# Patient Record
Sex: MALE | Race: WHITE | NOT HISPANIC OR LATINO
[De-identification: names, ages, dates, MRNs, and addresses within clinical notes are randomized per-mention and may not be internally consistent; named-entity substitution may affect disease eponyms.]

---

## 2017-01-06 ENCOUNTER — RX RENEWAL (OUTPATIENT)
Age: 72
End: 2017-01-06

## 2017-01-09 ENCOUNTER — CHART COPY (OUTPATIENT)
Age: 72
End: 2017-01-09

## 2017-01-09 DIAGNOSIS — M23.92 UNSPECIFIED INTERNAL DERANGEMENT OF LEFT KNEE: ICD-10-CM

## 2017-01-30 ENCOUNTER — RX RENEWAL (OUTPATIENT)
Age: 72
End: 2017-01-30

## 2017-03-13 DIAGNOSIS — M23.92 UNSPECIFIED INTERNAL DERANGEMENT OF LEFT KNEE: ICD-10-CM

## 2017-03-29 ENCOUNTER — APPOINTMENT (OUTPATIENT)
Dept: PHARMACY | Facility: CLINIC | Age: 72
End: 2017-03-29

## 2017-04-24 ENCOUNTER — RX RENEWAL (OUTPATIENT)
Age: 72
End: 2017-04-24

## 2017-05-09 ENCOUNTER — RX RENEWAL (OUTPATIENT)
Age: 72
End: 2017-05-09

## 2017-08-01 ENCOUNTER — RX RENEWAL (OUTPATIENT)
Age: 72
End: 2017-08-01

## 2017-08-01 RX ORDER — DICLOFENAC SODIUM 10 MG/G
1 GEL TOPICAL
Qty: 45 | Refills: 1 | Status: ACTIVE | COMMUNITY
Start: 2017-01-09 | End: 1900-01-01

## 2017-08-01 RX ORDER — TRIAMCINOLONE ACETONIDE 1 MG/G
0.1 CREAM TOPICAL
Qty: 1 | Refills: 2 | Status: ACTIVE | COMMUNITY
Start: 2017-08-01 | End: 1900-01-01

## 2017-08-04 ENCOUNTER — RX RENEWAL (OUTPATIENT)
Age: 72
End: 2017-08-04

## 2017-09-26 ENCOUNTER — RX RENEWAL (OUTPATIENT)
Age: 72
End: 2017-09-26

## 2017-10-03 ENCOUNTER — RX RENEWAL (OUTPATIENT)
Age: 72
End: 2017-10-03

## 2017-12-04 ENCOUNTER — RX RENEWAL (OUTPATIENT)
Age: 72
End: 2017-12-04

## 2017-12-11 ENCOUNTER — LABORATORY RESULT (OUTPATIENT)
Age: 72
End: 2017-12-11

## 2017-12-20 ENCOUNTER — RX RENEWAL (OUTPATIENT)
Age: 72
End: 2017-12-20

## 2017-12-20 RX ORDER — METHYLPREDNISOLONE 4 MG/1
4 TABLET ORAL
Qty: 50 | Refills: 1 | Status: ACTIVE | COMMUNITY
Start: 2017-03-13 | End: 1900-01-01

## 2017-12-20 RX ORDER — AMLODIPINE BESYLATE 5 MG/1
5 TABLET ORAL
Qty: 180 | Refills: 3 | Status: ACTIVE | COMMUNITY
Start: 2017-01-06 | End: 1900-01-01

## 2018-01-29 ENCOUNTER — RX RENEWAL (OUTPATIENT)
Age: 73
End: 2018-01-29

## 2018-08-09 ENCOUNTER — APPOINTMENT (OUTPATIENT)
Dept: PHARMACY | Facility: CLINIC | Age: 73
End: 2018-08-09
Payer: SELF-PAY

## 2018-08-09 PROCEDURE — V5299A: CUSTOM | Mod: NC

## 2018-08-27 ENCOUNTER — APPOINTMENT (OUTPATIENT)
Dept: SPEECH THERAPY | Facility: CLINIC | Age: 73
End: 2018-08-27

## 2018-08-27 ENCOUNTER — OUTPATIENT (OUTPATIENT)
Dept: OUTPATIENT SERVICES | Facility: HOSPITAL | Age: 73
LOS: 1 days | Discharge: HOME | End: 2018-08-27

## 2018-08-28 DIAGNOSIS — H90.A21 SENSORINEURAL HEARING LOSS, UNILATERAL, RIGHT EAR, WITH RESTRICTED HEARING ON THE CONTRALATERAL SIDE: ICD-10-CM

## 2018-10-10 ENCOUNTER — APPOINTMENT (OUTPATIENT)
Dept: PHARMACY | Facility: CLINIC | Age: 73
End: 2018-10-10
Payer: SELF-PAY

## 2018-10-10 PROCEDURE — V5014I: CUSTOM

## 2019-02-14 ENCOUNTER — APPOINTMENT (OUTPATIENT)
Dept: OTOLARYNGOLOGY | Facility: CLINIC | Age: 74
End: 2019-02-14
Payer: MEDICARE

## 2019-02-14 VITALS
WEIGHT: 229 LBS | SYSTOLIC BLOOD PRESSURE: 132 MMHG | DIASTOLIC BLOOD PRESSURE: 86 MMHG | HEIGHT: 70 IN | HEART RATE: 63 BPM | BODY MASS INDEX: 32.78 KG/M2

## 2019-02-14 DIAGNOSIS — R04.0 EPISTAXIS: ICD-10-CM

## 2019-02-14 PROCEDURE — 99204 OFFICE O/P NEW MOD 45 MIN: CPT | Mod: 25

## 2019-02-14 PROCEDURE — 31238 NSL/SINS NDSC SRG NSL HEMRRG: CPT | Mod: RT

## 2019-02-14 RX ORDER — PANTOPRAZOLE 20 MG/1
20 TABLET, DELAYED RELEASE ORAL
Qty: 90 | Refills: 0 | Status: ACTIVE | COMMUNITY
Start: 2018-12-12

## 2019-02-14 RX ORDER — ASPIRIN 81 MG
81 TABLET, DELAYED RELEASE (ENTERIC COATED) ORAL
Refills: 0 | Status: ACTIVE | COMMUNITY

## 2019-02-14 RX ORDER — NITROFURAZONE 0.2 %
OINTMENT (GRAM) TOPICAL
Refills: 0 | Status: ACTIVE | COMMUNITY

## 2019-02-14 RX ORDER — TADALAFIL 5 MG/1
5 TABLET ORAL
Qty: 5 | Refills: 0 | Status: ACTIVE | COMMUNITY
Start: 2019-01-18

## 2019-02-14 RX ORDER — AMLODIPINE AND VALSARTAN 10; 160 MG/1; MG/1
10-160 TABLET, FILM COATED ORAL
Qty: 45 | Refills: 0 | Status: ACTIVE | COMMUNITY
Start: 2018-11-10

## 2019-02-14 RX ORDER — DOXYCYCLINE HYCLATE 100 MG/1
100 TABLET ORAL
Qty: 11 | Refills: 0 | Status: ACTIVE | COMMUNITY
Start: 2018-08-15

## 2019-02-14 RX ORDER — FLUTICASONE FUROATE 27.5 UG/1
27.5 SPRAY, METERED NASAL
Refills: 0 | Status: ACTIVE | COMMUNITY

## 2019-02-14 RX ORDER — DOXYCYCLINE HYCLATE 100 MG/1
100 CAPSULE ORAL
Qty: 20 | Refills: 0 | Status: ACTIVE | COMMUNITY
Start: 2018-10-29

## 2019-02-14 RX ORDER — METFORMIN ER 500 MG 500 MG/1
500 TABLET ORAL
Qty: 360 | Refills: 0 | Status: ACTIVE | COMMUNITY
Start: 2018-12-23

## 2019-02-14 NOTE — PHYSICAL EXAM
[Midline] : trachea located in midline position [Normal] : no rashes [FreeTextEntry1] : right sided facial asymmetrcy

## 2019-02-14 NOTE — CONSULT LETTER
[Dear  ___] : Dear  [unfilled], [Consult Letter:] : I had the pleasure of evaluating your patient, [unfilled]. [Please see my note below.] : Please see my note below. [Consult Closing:] : Thank you very much for allowing me to participate in the care of this patient.  If you have any questions, please do not hesitate to contact me. [Sincerely,] : Sincerely, [FreeTextEntry3] : Erickson Clark MD\par [facility]\par Otolaryngology and Facial Plastics\par Associated Professor, Mary\par

## 2019-02-14 NOTE — HISTORY OF PRESENT ILLNESS
[de-identified] : Patient has had about two nosebleeds over the last few days. He has had dryness in both sides of the nose. He was using flonase and then some afrin as well in the right nostril at night and this didn’t help. He states that he had minor pain in the nostril one day last week as well. He does not have any nasal congestion or runny nose. HE tookhis blood pressure when this happened both times and it was only high after yesterday nosebleeds. He had an acoustic neuroma in 1987 and knows of his facial droop on the right. He has had the nosebleeds mostly from the right nostril

## 2019-02-14 NOTE — ASSESSMENT
[FreeTextEntry1] : Patient complaining of recurrent epistaxis predominately from his right nasal cavity. Patient has a history of having had an acoustic neuroma in the past. On examination endoscopically the source of bleeding was identified and cauterized with silver nitrate. He will followup with us as needed.

## 2020-12-24 ENCOUNTER — OUTPATIENT (OUTPATIENT)
Dept: OUTPATIENT SERVICES | Facility: HOSPITAL | Age: 75
LOS: 1 days | End: 2020-12-24
Payer: MEDICARE

## 2020-12-24 DIAGNOSIS — Z20.828 CONTACT WITH AND (SUSPECTED) EXPOSURE TO OTHER VIRAL COMMUNICABLE DISEASES: ICD-10-CM

## 2020-12-24 LAB — SARS-COV-2 RNA SPEC QL NAA+PROBE: SIGNIFICANT CHANGE UP

## 2020-12-24 PROCEDURE — U0003: CPT

## 2020-12-24 PROCEDURE — C9803: CPT

## 2020-12-25 DIAGNOSIS — Z20.828 CONTACT WITH AND (SUSPECTED) EXPOSURE TO OTHER VIRAL COMMUNICABLE DISEASES: ICD-10-CM

## 2021-01-06 ENCOUNTER — APPOINTMENT (OUTPATIENT)
Dept: PHARMACY | Facility: CLINIC | Age: 76
End: 2021-01-06
Payer: SELF-PAY

## 2021-01-06 PROCEDURE — V5267D: CUSTOM

## 2021-03-22 ENCOUNTER — TRANSCRIPTION ENCOUNTER (OUTPATIENT)
Age: 76
End: 2021-03-22

## 2021-04-06 ENCOUNTER — NON-APPOINTMENT (OUTPATIENT)
Age: 76
End: 2021-04-06

## 2021-04-12 ENCOUNTER — APPOINTMENT (OUTPATIENT)
Dept: OTOLARYNGOLOGY | Facility: CLINIC | Age: 76
End: 2021-04-12
Payer: MEDICARE

## 2021-04-12 VITALS
BODY MASS INDEX: 32.21 KG/M2 | HEIGHT: 70 IN | WEIGHT: 225 LBS | DIASTOLIC BLOOD PRESSURE: 96 MMHG | TEMPERATURE: 98 F | SYSTOLIC BLOOD PRESSURE: 163 MMHG | HEART RATE: 89 BPM

## 2021-04-12 PROCEDURE — 99213 OFFICE O/P EST LOW 20 MIN: CPT

## 2021-04-12 PROCEDURE — 99072 ADDL SUPL MATRL&STAF TM PHE: CPT

## 2021-04-13 NOTE — ASSESSMENT
[FreeTextEntry1] : Patient comes for annual evaluation had some cerumen impaction bilaterally which was curetted out the rest of his examination was essentially normal no other major complaints he just recently had some foot surgery is healing well he will follow up with us as needed.

## 2021-07-28 ENCOUNTER — APPOINTMENT (OUTPATIENT)
Dept: PHARMACY | Facility: CLINIC | Age: 76
End: 2021-07-28
Payer: SELF-PAY

## 2021-07-28 PROCEDURE — V5014C: CUSTOM | Mod: LT

## 2021-09-13 ENCOUNTER — INPATIENT (INPATIENT)
Facility: HOSPITAL | Age: 76
LOS: 4 days | Discharge: HOME HEALTH | End: 2021-09-18
Attending: INTERNAL MEDICINE | Admitting: INTERNAL MEDICINE
Payer: MEDICARE

## 2021-09-13 VITALS
SYSTOLIC BLOOD PRESSURE: 145 MMHG | TEMPERATURE: 101 F | DIASTOLIC BLOOD PRESSURE: 87 MMHG | HEART RATE: 109 BPM | OXYGEN SATURATION: 97 % | RESPIRATION RATE: 18 BRPM

## 2021-09-13 LAB
APTT BLD: 32.8 SEC — SIGNIFICANT CHANGE UP (ref 27–36.3)
HCT VFR BLD CALC: 41 % — SIGNIFICANT CHANGE UP (ref 39–50)
HGB BLD-MCNC: 13.5 G/DL — SIGNIFICANT CHANGE UP (ref 13–17)
IANC: 9.96 K/UL — HIGH (ref 1.5–8.5)
INR BLD: 1.13 RATIO — SIGNIFICANT CHANGE UP (ref 0.88–1.16)
MCHC RBC-ENTMCNC: 29.9 PG — SIGNIFICANT CHANGE UP (ref 27–34)
MCHC RBC-ENTMCNC: 32.9 GM/DL — SIGNIFICANT CHANGE UP (ref 32–36)
MCV RBC AUTO: 90.7 FL — SIGNIFICANT CHANGE UP (ref 80–100)
PLATELET # BLD AUTO: 165 K/UL — SIGNIFICANT CHANGE UP (ref 150–400)
PROTHROM AB SERPL-ACNC: 12.8 SEC — SIGNIFICANT CHANGE UP (ref 10.6–13.6)
RBC # BLD: 4.52 M/UL — SIGNIFICANT CHANGE UP (ref 4.2–5.8)
RBC # FLD: 13.5 % — SIGNIFICANT CHANGE UP (ref 10.3–14.5)
WBC # BLD: 11.12 K/UL — HIGH (ref 3.8–10.5)
WBC # FLD AUTO: 11.12 K/UL — HIGH (ref 3.8–10.5)

## 2021-09-13 RX ORDER — ACETAMINOPHEN 500 MG
650 TABLET ORAL ONCE
Refills: 0 | Status: COMPLETED | OUTPATIENT
Start: 2021-09-13 | End: 2021-09-13

## 2021-09-13 RX ORDER — CEFTRIAXONE 500 MG/1
1000 INJECTION, POWDER, FOR SOLUTION INTRAMUSCULAR; INTRAVENOUS ONCE
Refills: 0 | Status: COMPLETED | OUTPATIENT
Start: 2021-09-13 | End: 2021-09-13

## 2021-09-13 RX ORDER — VANCOMYCIN HCL 1 G
1000 VIAL (EA) INTRAVENOUS ONCE
Refills: 0 | Status: COMPLETED | OUTPATIENT
Start: 2021-09-13 | End: 2021-09-13

## 2021-09-13 RX ADMIN — Medication 650 MILLIGRAM(S): at 23:43

## 2021-09-13 RX ADMIN — CEFTRIAXONE 100 MILLIGRAM(S): 500 INJECTION, POWDER, FOR SOLUTION INTRAMUSCULAR; INTRAVENOUS at 23:44

## 2021-09-13 NOTE — ED PROVIDER NOTE - ATTENDING CONTRIBUTION TO CARE
I performed a face-to-face evaluation of the patient and performed a history and physical examination. I agree with the history and physical examination.    Yfn: Several weeks of right lower extremity pain, swelling, and redness. Responded to oral antibiotics; now worsening after a long car ride. Consider DVT (recent long ride; get ultrasound. Admit for vancomycin. I performed a face-to-face evaluation of the patient and performed a history and physical examination. I agree with the history and physical examination.    Yfn: H/o prostate Bx f/b AMS. Around the same, time, had several weeks of right lower extremity pain, swelling, and redness. Responded to oral antibiotics (Cipro, Keflex, Doxy); now worsening after a long car ride (went to [employer] and to [place] [employer]). RLE: not significantly warm/red/tender. Consider DVT (recent long ride); get ultrasound. No known tick bites (and took Doxy), but consider Lyme. Check labs. Has SIRS criteria. Admit for vancomycin and CXR.

## 2021-09-13 NOTE — ED PROVIDER NOTE - PHYSICAL EXAMINATION
gen: well appearing  Mentation: AAO x 3  psych: mood appropriate  ENT: airway patent  Eyes: conjunctivae clear bilaterally  Cardio: RRR, no m/r/g  Resp: normal BS b/l  GI: s/nt/nd  : no CVA tenderness  Neuro: sensation and motor function intact  Skin: No evidence of rash, no erythema of LE noted   MSK: normal movement of all extremities  Lymph/Vasc: + b/l LE edema, nontender

## 2021-09-13 NOTE — ED PROVIDER NOTE - NS ED ROS FT
CONSTITUTIONAL: +fevers, no lightheadedness, no dizziness  Eyes: no visual changes  Ears: no ear drainage, no ear pain  Nose: no nasal congestion  Mouth/Throat: no sore throat  CV: No chest pain, no palpitations  PULM: No SOB, no cough  GI: No n/v/d, no abd pain  : no dysuria, no hematuria  SKIN: +RLE redness  NEURO: no headache, no focal weakness or numbness  LYMPH/VASC: + b/l LE swelling

## 2021-09-13 NOTE — ED ADULT TRIAGE NOTE - CHIEF COMPLAINT QUOTE
Pt c/o reoccurring RLE cellulitis, fevers since today. Also c/o chills. Last took tylenol at 4pm. PMHx brain tumor, hepatitis A, R. foot sx for bunion/hammer toe, pre-diabetes    Wife (Amelia) - [phone]

## 2021-09-13 NOTE — ED PROVIDER NOTE - PROGRESS NOTE DETAILS
MD CHO:  I received s/o on this pt from Dr. Coulter.  Admitted to hospitalist service for sepsis.  Spoke with Dr. [place] Saunders who requested pt be switched to the service of Dr. Jose M Bell.  I spoke with Dr. Bell who accepts the pt to his service.  Charge nurse, Nerissa, spoke with bed board regarding change to accepting attending physician.

## 2021-09-13 NOTE — ED PROVIDER NOTE - OBJECTIVE STATEMENT
Son = Mark [facility]: [phone] Son = Mark Jules: [phone]  74 y/o M with PMH of HTN recent prostate biopsy presenting with fevers. 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 States symptoms worse after car ride to [place]. Admits to b/l LE edema, states is around baseline. No cp, cough, sob, n/v/d, abd pain, dysuria, hematuria.

## 2021-09-13 NOTE — ED PROVIDER NOTE - CLINICAL SUMMARY MEDICAL DECISION MAKING FREE TEXT BOX
Yfn: Several weeks of right lower extremity pain, swelling, and redness. Responded to oral antibiotics; now worsening after a long car ride. Consider DVT (recent long ride; get ultrasound. Admit for vancomycin. Yfn: H/o prostate Bx f/b AMS. Around the same, time, had several weeks of right lower extremity pain, swelling, and redness. Responded to oral antibiotics (Cipro, Keflex, Doxy); now worsening after a long car ride (went to [facility] and to [employer] for [employer]). RLE: not significantly warm/red/tender. Consider DVT (recent long ride); get ultrasound. No known tick bites (and took Doxy), but consider Lyme. Check labs. Has SIRS criteria. Admit for vancomycin and CXR.

## 2021-09-14 DIAGNOSIS — E78.5 HYPERLIPIDEMIA, UNSPECIFIED: ICD-10-CM

## 2021-09-14 DIAGNOSIS — L03.90 CELLULITIS, UNSPECIFIED: ICD-10-CM

## 2021-09-14 DIAGNOSIS — E11.9 TYPE 2 DIABETES MELLITUS WITHOUT COMPLICATIONS: ICD-10-CM

## 2021-09-14 DIAGNOSIS — R50.9 FEVER, UNSPECIFIED: ICD-10-CM

## 2021-09-14 DIAGNOSIS — I10 ESSENTIAL (PRIMARY) HYPERTENSION: ICD-10-CM

## 2021-09-14 DIAGNOSIS — M79.89 OTHER SPECIFIED SOFT TISSUE DISORDERS: ICD-10-CM

## 2021-09-14 LAB
ALBUMIN SERPL ELPH-MCNC: 4.2 G/DL — SIGNIFICANT CHANGE UP (ref 3.3–5)
ALP SERPL-CCNC: 117 U/L — SIGNIFICANT CHANGE UP (ref 40–120)
ALT FLD-CCNC: 24 U/L — SIGNIFICANT CHANGE UP (ref 4–41)
ANION GAP SERPL CALC-SCNC: 15 MMOL/L — HIGH (ref 7–14)
APPEARANCE UR: CLEAR — SIGNIFICANT CHANGE UP
AST SERPL-CCNC: 22 U/L — SIGNIFICANT CHANGE UP (ref 4–40)
B PERT DNA SPEC QL NAA+PROBE: SIGNIFICANT CHANGE UP
B PERT DNA SPEC QL NAA+PROBE: SIGNIFICANT CHANGE UP
B PERT+PARAPERT DNA PNL SPEC NAA+PROBE: SIGNIFICANT CHANGE UP
B PERT+PARAPERT DNA PNL SPEC NAA+PROBE: SIGNIFICANT CHANGE UP
BASOPHILS # BLD AUTO: 0.04 K/UL — SIGNIFICANT CHANGE UP (ref 0–0.2)
BASOPHILS NFR BLD AUTO: 0.4 % — SIGNIFICANT CHANGE UP (ref 0–2)
BILIRUB SERPL-MCNC: 0.5 MG/DL — SIGNIFICANT CHANGE UP (ref 0.2–1.2)
BILIRUB UR-MCNC: NEGATIVE — SIGNIFICANT CHANGE UP
BLOOD GAS VENOUS COMPREHENSIVE RESULT: SIGNIFICANT CHANGE UP
BLOOD GAS VENOUS COMPREHENSIVE RESULT: SIGNIFICANT CHANGE UP
BORDETELLA PARAPERTUSSIS (RAPRVP): SIGNIFICANT CHANGE UP
BORDETELLA PARAPERTUSSIS (RAPRVP): SIGNIFICANT CHANGE UP
BUN SERPL-MCNC: 21 MG/DL — SIGNIFICANT CHANGE UP (ref 7–23)
C PNEUM DNA SPEC QL NAA+PROBE: SIGNIFICANT CHANGE UP
C PNEUM DNA SPEC QL NAA+PROBE: SIGNIFICANT CHANGE UP
CALCIUM SERPL-MCNC: 8.9 MG/DL — SIGNIFICANT CHANGE UP (ref 8.4–10.5)
CHLORIDE SERPL-SCNC: 99 MMOL/L — SIGNIFICANT CHANGE UP (ref 98–107)
CO2 SERPL-SCNC: 21 MMOL/L — LOW (ref 22–31)
COLOR SPEC: SIGNIFICANT CHANGE UP
CREAT SERPL-MCNC: 1.19 MG/DL — SIGNIFICANT CHANGE UP (ref 0.5–1.3)
DIFF PNL FLD: NEGATIVE — SIGNIFICANT CHANGE UP
EOSINOPHIL # BLD AUTO: 0.03 K/UL — SIGNIFICANT CHANGE UP (ref 0–0.5)
EOSINOPHIL NFR BLD AUTO: 0.3 % — SIGNIFICANT CHANGE UP (ref 0–6)
FLUAV SUBTYP SPEC NAA+PROBE: SIGNIFICANT CHANGE UP
FLUAV SUBTYP SPEC NAA+PROBE: SIGNIFICANT CHANGE UP
FLUBV RNA SPEC QL NAA+PROBE: SIGNIFICANT CHANGE UP
FLUBV RNA SPEC QL NAA+PROBE: SIGNIFICANT CHANGE UP
GLUCOSE SERPL-MCNC: 170 MG/DL — HIGH (ref 70–99)
GLUCOSE UR QL: NEGATIVE — SIGNIFICANT CHANGE UP
GP B STREP DNA BLD POS QL NAA+NON-PROBE: SIGNIFICANT CHANGE UP
GRAM STN FLD: SIGNIFICANT CHANGE UP
GRAM STN FLD: SIGNIFICANT CHANGE UP
HADV DNA SPEC QL NAA+PROBE: SIGNIFICANT CHANGE UP
HADV DNA SPEC QL NAA+PROBE: SIGNIFICANT CHANGE UP
HCOV 229E RNA SPEC QL NAA+PROBE: SIGNIFICANT CHANGE UP
HCOV 229E RNA SPEC QL NAA+PROBE: SIGNIFICANT CHANGE UP
HCOV HKU1 RNA SPEC QL NAA+PROBE: SIGNIFICANT CHANGE UP
HCOV HKU1 RNA SPEC QL NAA+PROBE: SIGNIFICANT CHANGE UP
HCOV NL63 RNA SPEC QL NAA+PROBE: SIGNIFICANT CHANGE UP
HCOV NL63 RNA SPEC QL NAA+PROBE: SIGNIFICANT CHANGE UP
HCOV OC43 RNA SPEC QL NAA+PROBE: SIGNIFICANT CHANGE UP
HCOV OC43 RNA SPEC QL NAA+PROBE: SIGNIFICANT CHANGE UP
HMPV RNA SPEC QL NAA+PROBE: SIGNIFICANT CHANGE UP
HMPV RNA SPEC QL NAA+PROBE: SIGNIFICANT CHANGE UP
HPIV1 RNA SPEC QL NAA+PROBE: SIGNIFICANT CHANGE UP
HPIV1 RNA SPEC QL NAA+PROBE: SIGNIFICANT CHANGE UP
HPIV2 RNA SPEC QL NAA+PROBE: SIGNIFICANT CHANGE UP
HPIV2 RNA SPEC QL NAA+PROBE: SIGNIFICANT CHANGE UP
HPIV3 RNA SPEC QL NAA+PROBE: SIGNIFICANT CHANGE UP
HPIV3 RNA SPEC QL NAA+PROBE: SIGNIFICANT CHANGE UP
HPIV4 RNA SPEC QL NAA+PROBE: SIGNIFICANT CHANGE UP
HPIV4 RNA SPEC QL NAA+PROBE: SIGNIFICANT CHANGE UP
IMM GRANULOCYTES NFR BLD AUTO: 0.5 % — SIGNIFICANT CHANGE UP (ref 0–1.5)
KETONES UR-MCNC: NEGATIVE — SIGNIFICANT CHANGE UP
LEUKOCYTE ESTERASE UR-ACNC: NEGATIVE — SIGNIFICANT CHANGE UP
LYME C6 AB IGG/IGM EIA REFLEX WESTERN BL: SIGNIFICANT CHANGE UP
LYMPHOCYTES # BLD AUTO: 0.35 K/UL — LOW (ref 1–3.3)
LYMPHOCYTES # BLD AUTO: 3.1 % — LOW (ref 13–44)
M PNEUMO DNA SPEC QL NAA+PROBE: SIGNIFICANT CHANGE UP
M PNEUMO DNA SPEC QL NAA+PROBE: SIGNIFICANT CHANGE UP
METHOD TYPE: SIGNIFICANT CHANGE UP
MONOCYTES # BLD AUTO: 0.68 K/UL — SIGNIFICANT CHANGE UP (ref 0–0.9)
MONOCYTES NFR BLD AUTO: 6.1 % — SIGNIFICANT CHANGE UP (ref 2–14)
NEUTROPHILS # BLD AUTO: 9.96 K/UL — HIGH (ref 1.8–7.4)
NEUTROPHILS NFR BLD AUTO: 89.6 % — HIGH (ref 43–77)
NITRITE UR-MCNC: NEGATIVE — SIGNIFICANT CHANGE UP
NRBC # BLD: 0 /100 WBCS — SIGNIFICANT CHANGE UP
NRBC # FLD: 0 K/UL — SIGNIFICANT CHANGE UP
PH UR: 6 — SIGNIFICANT CHANGE UP (ref 5–8)
POTASSIUM SERPL-MCNC: 4.1 MMOL/L — SIGNIFICANT CHANGE UP (ref 3.5–5.3)
POTASSIUM SERPL-SCNC: 4.1 MMOL/L — SIGNIFICANT CHANGE UP (ref 3.5–5.3)
PROT SERPL-MCNC: 6.7 G/DL — SIGNIFICANT CHANGE UP (ref 6–8.3)
PROT UR-MCNC: NEGATIVE — SIGNIFICANT CHANGE UP
RAPID RVP RESULT: SIGNIFICANT CHANGE UP
RAPID RVP RESULT: SIGNIFICANT CHANGE UP
RSV RNA SPEC QL NAA+PROBE: SIGNIFICANT CHANGE UP
RSV RNA SPEC QL NAA+PROBE: SIGNIFICANT CHANGE UP
RV+EV RNA SPEC QL NAA+PROBE: SIGNIFICANT CHANGE UP
RV+EV RNA SPEC QL NAA+PROBE: SIGNIFICANT CHANGE UP
SARS-COV-2 RNA SPEC QL NAA+PROBE: SIGNIFICANT CHANGE UP
SODIUM SERPL-SCNC: 135 MMOL/L — SIGNIFICANT CHANGE UP (ref 135–145)
SP GR SPEC: 1.01 — SIGNIFICANT CHANGE UP (ref 1–1.05)
SPECIMEN SOURCE: SIGNIFICANT CHANGE UP
SPECIMEN SOURCE: SIGNIFICANT CHANGE UP
UROBILINOGEN FLD QL: SIGNIFICANT CHANGE UP

## 2021-09-14 PROCEDURE — 93971 EXTREMITY STUDY: CPT | Mod: 26,LT

## 2021-09-14 PROCEDURE — 71045 X-RAY EXAM CHEST 1 VIEW: CPT | Mod: 26

## 2021-09-14 PROCEDURE — 99223 1ST HOSP IP/OBS HIGH 75: CPT

## 2021-09-14 RX ORDER — VANCOMYCIN HCL 1 G
1000 VIAL (EA) INTRAVENOUS EVERY 12 HOURS
Refills: 0 | Status: DISCONTINUED | OUTPATIENT
Start: 2021-09-14 | End: 2021-09-15

## 2021-09-14 RX ORDER — CEFAZOLIN SODIUM 1 G
VIAL (EA) INJECTION
Refills: 0 | Status: DISCONTINUED | OUTPATIENT
Start: 2021-09-14 | End: 2021-09-14

## 2021-09-14 RX ORDER — INFLUENZA VIRUS VACCINE 15; 15; 15; 15 UG/.5ML; UG/.5ML; UG/.5ML; UG/.5ML
0.5 SUSPENSION INTRAMUSCULAR ONCE
Refills: 0 | Status: DISCONTINUED | OUTPATIENT
Start: 2021-09-14 | End: 2021-09-14

## 2021-09-14 RX ORDER — PANTOPRAZOLE SODIUM 20 MG/1
40 TABLET, DELAYED RELEASE ORAL
Refills: 0 | Status: DISCONTINUED | OUTPATIENT
Start: 2021-09-14 | End: 2021-09-18

## 2021-09-14 RX ORDER — VALSARTAN 80 MG/1
160 TABLET ORAL DAILY
Refills: 0 | Status: DISCONTINUED | OUTPATIENT
Start: 2021-09-14 | End: 2021-09-18

## 2021-09-14 RX ORDER — SODIUM CHLORIDE 9 MG/ML
1000 INJECTION INTRAMUSCULAR; INTRAVENOUS; SUBCUTANEOUS ONCE
Refills: 0 | Status: COMPLETED | OUTPATIENT
Start: 2021-09-14 | End: 2021-09-14

## 2021-09-14 RX ORDER — INFLUENZA VIRUS VACCINE 15; 15; 15; 15 UG/.5ML; UG/.5ML; UG/.5ML; UG/.5ML
0.7 SUSPENSION INTRAMUSCULAR ONCE
Refills: 0 | Status: COMPLETED | OUTPATIENT
Start: 2021-09-14 | End: 2021-09-18

## 2021-09-14 RX ORDER — ACETAMINOPHEN 500 MG
650 TABLET ORAL EVERY 6 HOURS
Refills: 0 | Status: DISCONTINUED | OUTPATIENT
Start: 2021-09-14 | End: 2021-09-18

## 2021-09-14 RX ORDER — CEFAZOLIN SODIUM 1 G
2000 VIAL (EA) INJECTION EVERY 8 HOURS
Refills: 0 | Status: DISCONTINUED | OUTPATIENT
Start: 2021-09-14 | End: 2021-09-16

## 2021-09-14 RX ORDER — ENOXAPARIN SODIUM 100 MG/ML
40 INJECTION SUBCUTANEOUS DAILY
Refills: 0 | Status: DISCONTINUED | OUTPATIENT
Start: 2021-09-14 | End: 2021-09-16

## 2021-09-14 RX ORDER — SODIUM CHLORIDE 9 MG/ML
1000 INJECTION, SOLUTION INTRAVENOUS ONCE
Refills: 0 | Status: COMPLETED | OUTPATIENT
Start: 2021-09-14 | End: 2021-09-14

## 2021-09-14 RX ORDER — AMLODIPINE BESYLATE 2.5 MG/1
5 TABLET ORAL DAILY
Refills: 0 | Status: DISCONTINUED | OUTPATIENT
Start: 2021-09-14 | End: 2021-09-18

## 2021-09-14 RX ORDER — SODIUM CHLORIDE 0.65 %
1 AEROSOL, SPRAY (ML) NASAL
Refills: 0 | Status: DISCONTINUED | OUTPATIENT
Start: 2021-09-14 | End: 2021-09-18

## 2021-09-14 RX ADMIN — Medication 650 MILLIGRAM(S): at 20:03

## 2021-09-14 RX ADMIN — Medication 650 MILLIGRAM(S): at 19:33

## 2021-09-14 RX ADMIN — Medication 250 MILLIGRAM(S): at 19:33

## 2021-09-14 RX ADMIN — VALSARTAN 160 MILLIGRAM(S): 80 TABLET ORAL at 15:08

## 2021-09-14 RX ADMIN — AMLODIPINE BESYLATE 5 MILLIGRAM(S): 2.5 TABLET ORAL at 15:08

## 2021-09-14 RX ADMIN — Medication 650 MILLIGRAM(S): at 00:45

## 2021-09-14 RX ADMIN — SODIUM CHLORIDE 1000 MILLILITER(S): 9 INJECTION, SOLUTION INTRAVENOUS at 03:55

## 2021-09-14 RX ADMIN — ENOXAPARIN SODIUM 40 MILLIGRAM(S): 100 INJECTION SUBCUTANEOUS at 15:08

## 2021-09-14 RX ADMIN — SODIUM CHLORIDE 1000 MILLILITER(S): 9 INJECTION INTRAMUSCULAR; INTRAVENOUS; SUBCUTANEOUS at 00:26

## 2021-09-14 RX ADMIN — Medication 250 MILLIGRAM(S): at 00:14

## 2021-09-14 RX ADMIN — Medication 100 MILLIGRAM(S): at 21:41

## 2021-09-14 RX ADMIN — PANTOPRAZOLE SODIUM 40 MILLIGRAM(S): 20 TABLET, DELAYED RELEASE ORAL at 15:09

## 2021-09-14 RX ADMIN — Medication 1 SPRAY(S): at 15:08

## 2021-09-14 NOTE — H&P ADULT - NSHPREVIEWOFSYSTEMS_GEN_ALL_CORE
Gen: no loss of wt no loss of appetite see above HPI   ENT: no dizziness no hearing loss  Ophth: no blurring of vision no loss of vision  Resp: No cough no sputum production  CVS: No chest pain no palpitations no orthopnea  GI: no nausea, vomiting see above HPI   : no dysuria, hematuria  Endo: no polyuria no excessive sweating  Neuro: no weakness no paresthesias  Heme: No petechiae no easy bruising  Msk: No joint pain no swelling  Skin: No rash no itching

## 2021-09-14 NOTE — CONSULT NOTE ADULT - PROBLEM SELECTOR RECOMMENDATION 9
Blood and urine cultures pending    Abx based on culture results    Case discussed with ED staff , nursing staff, and with Dr. Jose M Bell

## 2021-09-14 NOTE — H&P ADULT - REASON FOR ADMISSION
fever and chills Saucerization Excision Additional Text (Leave Blank If You Do Not Want): The margin was drawn around the clinically apparent lesion.  Incisions were then made along these lines, in a tangential fashion, to the appropriate tissue plane and the lesion was extirpated.

## 2021-09-14 NOTE — ED ADULT NURSE REASSESSMENT NOTE - NS ED NURSE REASSESS COMMENT FT1
Repeat lactate sent when pt returned from [place]. Respirations even and unlabored. Will continue to monitor.

## 2021-09-14 NOTE — ED ADULT NURSE NOTE - CHIEF COMPLAINT QUOTE
Pt c/o reoccurring RLE cellulitis, fevers since today. Also c/o chills. Last took tylenol at 4pm. PMHx brain tumor, hepatitis A, R. foot sx for bunion/hammer toe, pre-diabetes    Wife (Amelia) - [phone]

## 2021-09-14 NOTE — ED ADULT NURSE REASSESSMENT NOTE - NS ED NURSE REASSESS COMMENT FT1
patient aaox3. ambulatory. came in with bilateral lower extremity edema. noticed to have more left than right edema. non pitting. patient able to ambulate independently. skin warm and dry. pulses palpable. positive for blood cultures. denies fevers, chills, fatigue, weakness, lightheadedness. appears comfortable. respirations even and unlabored on room air. left ac #20g. Will continue to monitor

## 2021-09-14 NOTE — H&P ADULT - NSICDXFAMILYHX_GEN_ALL_CORE_FT
FAMILY HISTORY:  Father  Still living? Unknown  Family hx of prostate cancer, Age at diagnosis: Age Unknown  FH: type 2 diabetes, Age at diagnosis: Age Unknown    Mother  Still living? Unknown  FH: type 2 diabetes, Age at diagnosis: Age Unknown

## 2021-09-14 NOTE — H&P ADULT - ASSESSMENT
76 y/o M with PMH of HTN, diet controlled DM, HLD recent prostate biopsy presenting with fevers of unclear etiology

## 2021-09-14 NOTE — H&P ADULT - NSHPPHYSICALEXAM_GEN_ALL_CORE
PHYSICAL EXAM: vital signs noted on [place]  in no apparent distress  HEENT: SHA EOMI  Neck: Supple, no JVD, no thyromegaly  Lungs: no wheeze, no crackles  CVS: S1 S2 no M/R/G  Abdomen: no tenderness, no organomegaly, BS present  Neuro: AO x 3 no focal weakness, no sensory abnormalities  Psych: appropriate affect  Skin: warm, dry  Ext: no cyanosis or clubbing, trace bilateral edema  Msk: no joint swelling or deformities  Back: no CVA tenderness, no kyphosis/scoliosis

## 2021-09-14 NOTE — H&P ADULT - HISTORY OF PRESENT ILLNESS
76 y/o M with PMH of HTN, recent prostate biopsy 2 months ago, HLD, pre-DM presenting with fevers. 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 He states symptoms were worse after car ride to [place]. Admits to b/l SANJAY edema, states is around baseline. No dysuria at all. + 3 episodes of loose stools yesterday last BM yesterday. Took Pepto-Bismol with resolution of diarrhea

## 2021-09-14 NOTE — PROVIDER CONTACT NOTE (CRITICAL VALUE NOTIFICATION) - SITUATION
blood cultures positive for gram+ cocci in pairs in anaerobic
blood cultures positive for gram+ cocci in pairs in anaerobic

## 2021-09-14 NOTE — H&P ADULT - NSHPADDITIONALINFOADULT_GEN_ALL_CORE
discussed with patient in detail, expresses understanding of treatment plans.  discussed with PCP DR Saunders

## 2021-09-14 NOTE — H&P ADULT - PROBLEM SELECTOR PLAN 1
unclear etiology  urinalysis is negative  CXR negative  exam unremarkable  no evidence of residual cellulitis   urine culture and blood cultures testing  s/p ceftriaxone IV and vancomycin in the Emergency Department  ID help requested  will defer further antibiotics to ID

## 2021-09-14 NOTE — ED ADULT NURSE NOTE - OBJECTIVE STATEMENT
Pt received to rm 12, A&OX4, ambulatory. Hx of cellulitis. Recently treated for cellulitis to R leg without relief. RLE appears red and swollen, warm to touch. Left LE appears swollen. States he recently went on a long car ride and symptoms worsened, prompting visit to ED. Respirations even and unlabored. 20G IV placed to RAC. Denies CP, SOB, N/V/D. Labs sent. Medicated per orders. Will continue to monitor.

## 2021-09-15 DIAGNOSIS — Z29.9 ENCOUNTER FOR PROPHYLACTIC MEASURES, UNSPECIFIED: ICD-10-CM

## 2021-09-15 DIAGNOSIS — R78.81 BACTEREMIA: ICD-10-CM

## 2021-09-15 LAB
A1C WITH ESTIMATED AVERAGE GLUCOSE RESULT: 5.7 % — HIGH (ref 4–5.6)
ANION GAP SERPL CALC-SCNC: 11 MMOL/L — SIGNIFICANT CHANGE UP (ref 7–14)
BUN SERPL-MCNC: 14 MG/DL — SIGNIFICANT CHANGE UP (ref 7–23)
CALCIUM SERPL-MCNC: 8.7 MG/DL — SIGNIFICANT CHANGE UP (ref 8.4–10.5)
CHLORIDE SERPL-SCNC: 103 MMOL/L — SIGNIFICANT CHANGE UP (ref 98–107)
CHOLEST SERPL-MCNC: 152 MG/DL — SIGNIFICANT CHANGE UP
CO2 SERPL-SCNC: 23 MMOL/L — SIGNIFICANT CHANGE UP (ref 22–31)
CREAT SERPL-MCNC: 1.04 MG/DL — SIGNIFICANT CHANGE UP (ref 0.5–1.3)
CULTURE RESULTS: NO GROWTH — SIGNIFICANT CHANGE UP
CULTURE RESULTS: SIGNIFICANT CHANGE UP
ESTIMATED AVERAGE GLUCOSE: 117 — SIGNIFICANT CHANGE UP
GLUCOSE SERPL-MCNC: 143 MG/DL — HIGH (ref 70–99)
HCT VFR BLD CALC: 38 % — LOW (ref 39–50)
HCV AB S/CO SERPL IA: 0.15 S/CO — SIGNIFICANT CHANGE UP (ref 0–0.99)
HCV AB SERPL-IMP: SIGNIFICANT CHANGE UP
HDLC SERPL-MCNC: 44 MG/DL — SIGNIFICANT CHANGE UP
HGB BLD-MCNC: 12.7 G/DL — LOW (ref 13–17)
LIPID PNL WITH DIRECT LDL SERPL: 85 MG/DL — SIGNIFICANT CHANGE UP
MCHC RBC-ENTMCNC: 30.9 PG — SIGNIFICANT CHANGE UP (ref 27–34)
MCHC RBC-ENTMCNC: 33.4 GM/DL — SIGNIFICANT CHANGE UP (ref 32–36)
MCV RBC AUTO: 92.5 FL — SIGNIFICANT CHANGE UP (ref 80–100)
NON HDL CHOLESTEROL: 108 MG/DL — SIGNIFICANT CHANGE UP
NRBC # BLD: 0 /100 WBCS — SIGNIFICANT CHANGE UP
NRBC # FLD: 0 K/UL — SIGNIFICANT CHANGE UP
PLATELET # BLD AUTO: 137 K/UL — LOW (ref 150–400)
POTASSIUM SERPL-MCNC: 3.7 MMOL/L — SIGNIFICANT CHANGE UP (ref 3.5–5.3)
POTASSIUM SERPL-SCNC: 3.7 MMOL/L — SIGNIFICANT CHANGE UP (ref 3.5–5.3)
RBC # BLD: 4.11 M/UL — LOW (ref 4.2–5.8)
RBC # FLD: 13.6 % — SIGNIFICANT CHANGE UP (ref 10.3–14.5)
SODIUM SERPL-SCNC: 137 MMOL/L — SIGNIFICANT CHANGE UP (ref 135–145)
SPECIMEN SOURCE: SIGNIFICANT CHANGE UP
SPECIMEN SOURCE: SIGNIFICANT CHANGE UP
TRIGL SERPL-MCNC: 116 MG/DL — SIGNIFICANT CHANGE UP
TSH SERPL-MCNC: 1.51 UIU/ML — SIGNIFICANT CHANGE UP (ref 0.27–4.2)
WBC # BLD: 4 K/UL — SIGNIFICANT CHANGE UP (ref 3.8–10.5)
WBC # FLD AUTO: 4 K/UL — SIGNIFICANT CHANGE UP (ref 3.8–10.5)

## 2021-09-15 PROCEDURE — 93971 EXTREMITY STUDY: CPT | Mod: 26,LT

## 2021-09-15 PROCEDURE — 99233 SBSQ HOSP IP/OBS HIGH 50: CPT

## 2021-09-15 RX ORDER — VALSARTAN 80 MG/1
1 TABLET ORAL
Qty: 0 | Refills: 0 | DISCHARGE

## 2021-09-15 RX ORDER — METFORMIN HYDROCHLORIDE 850 MG/1
1 TABLET ORAL
Qty: 0 | Refills: 0 | DISCHARGE

## 2021-09-15 RX ORDER — OXYBUTYNIN CHLORIDE 5 MG
1 TABLET ORAL
Qty: 0 | Refills: 0 | DISCHARGE

## 2021-09-15 RX ORDER — PANTOPRAZOLE SODIUM 20 MG/1
1 TABLET, DELAYED RELEASE ORAL
Qty: 0 | Refills: 0 | DISCHARGE

## 2021-09-15 RX ORDER — OXYBUTYNIN CHLORIDE 5 MG
5 TABLET ORAL
Refills: 0 | Status: DISCONTINUED | OUTPATIENT
Start: 2021-09-15 | End: 2021-09-18

## 2021-09-15 RX ADMIN — Medication 100 MILLIGRAM(S): at 06:18

## 2021-09-15 RX ADMIN — Medication 100 MILLIGRAM(S): at 21:14

## 2021-09-15 RX ADMIN — AMLODIPINE BESYLATE 5 MILLIGRAM(S): 2.5 TABLET ORAL at 06:18

## 2021-09-15 RX ADMIN — Medication 5 MILLIGRAM(S): at 18:11

## 2021-09-15 RX ADMIN — ENOXAPARIN SODIUM 40 MILLIGRAM(S): 100 INJECTION SUBCUTANEOUS at 11:53

## 2021-09-15 RX ADMIN — Medication 100 MILLIGRAM(S): at 13:37

## 2021-09-15 RX ADMIN — VALSARTAN 160 MILLIGRAM(S): 80 TABLET ORAL at 06:18

## 2021-09-15 RX ADMIN — PANTOPRAZOLE SODIUM 40 MILLIGRAM(S): 20 TABLET, DELAYED RELEASE ORAL at 06:18

## 2021-09-15 RX ADMIN — Medication 250 MILLIGRAM(S): at 07:41

## 2021-09-15 NOTE — PROGRESS NOTE ADULT - SUBJECTIVE AND OBJECTIVE BOX
Patient is a 75y old  Male who presents with a chief complaint of fever and chills      INTERVAL HPI/OVERNIGHT EVENTS: No reported overnight events. Pt denies any complaints overnight. Has had some redness and pain of L shin. BCx returned positive for GBS. Pt discussed extensive hx of working out in the woods, swimming in pools, etc that could have led to cellulitis. Pt also has had hx of 3 lifetime cellulitic infections of the leg.       REVIEW OF SYSTEMS:  CONSTITUTIONAL: No fever, weight loss, or fatigue  EYES: No eye pain, visual disturbances, or discharge  ENMT:  No difficulty hearing, tinnitus, vertigo; No sinus or throat pain  NECK: No pain or stiffness  BREASTS: No pain, masses, or nipple discharge  RESPIRATORY: No cough, wheezing, chills or hemoptysis; No shortness of breath  CARDIOVASCULAR: No chest pain, palpitations, dizziness, or leg swelling  GASTROINTESTINAL: No abdominal or epigastric pain. No nausea, vomiting, or hematemesis; No diarrhea or constipation. No melena or hematochezia.  GENITOURINARY: No dysuria, frequency, hematuria, or incontinence  NEUROLOGICAL: No headaches, memory loss, loss of strength, numbness, or tremors  SKIN: No itching, burning, rashes, or lesions   LYMPH NODES: No enlarged glands  ENDOCRINE: No heat or cold intolerance; No hair loss  MUSCULOSKELETAL: No joint pain or swelling; No muscle, back, or extremity pain  PSYCHIATRIC: No depression, anxiety, mood swings, or difficulty sleeping  HEME/LYMPH: No easy bruising, or bleeding gums  ALLERGY AND IMMUNOLOGIC: No hives or eczema    FAMILY HISTORY:  Family hx of prostate cancer (Father)    FH: type 2 diabetes (Father, Mother)      Vital Signs Last 24 Hrs  T(C): 36.5 (15 Sep 2021 06:14), Max: 37.9 (14 Sep 2021 18:56)  T(F): 97.7 (15 Sep 2021 06:14), Max: 100.2 (14 Sep 2021 18:56)  HR: 62 (15 Sep 2021 06:14) (62 - 86)  BP: 144/85 (15 Sep 2021 06:14) (114/85 - 145/98)  BP(mean): --  RR: 18 (15 Sep 2021 06:14) (16 - 18)  SpO2: 95% (15 Sep 2021 06:14) (95% - 98%)    ALLERGIES  penicillin (Rash)    PHYSICAL EXAM:  GENERAL: NAD, well-groomed, well-developed  HEAD:  Atraumatic, Normocephalic  EYES: EOMI, PERRLA, conjunctiva and sclera clear  ENMT: No tonsillar erythema, exudates, or enlargement; Moist mucous membranes, Good dentition, No lesions  NECK: Supple, No JVD, Normal thyroid  NERVOUS SYSTEM:  Alert & Oriented X3, Good concentration; Motor Strength 5/5 B/L upper and lower extremities; DTRs 2+ intact and symmetric  CHEST/LUNG: Clear to percussion bilaterally; No rales, rhonchi, wheezing, or rubs  HEART: Regular rate and rhythm; No murmurs, rubs, or gallops  ABDOMEN: Soft, Nontender, Nondistended; Bowel sounds present  EXTREMITIES:  2+ Peripheral Pulses, No clubbing, cyanosis. 1+ pitting edema b/l  LYMPH: No lymphadenopathy noted  SKIN: No rashes or lesions. Erythematous and warm, raised area of L shin over tibia    Consultant(s) Notes Reviewed:  [x ] YES  [ ] NO  Care Discussed with Consultants/Other Providers [ x] YES  [ ] NO    LABS:                        12.7   4.00  )-----------( 137      ( 15 Sep 2021 07:43 )             38.0     09-15    137  |  103  |  14  ----------------------------<  143<H>  3.7   |  23  |  1.04    Ca    8.7      15 Sep 2021 07:43    TPro  6.7  /  Alb  4.2  /  TBili  0.5  /  DBili  x   /  AST  22  /  ALT  24  /  AlkPhos  117  09-13      RADIOLOGY & ADDITIONAL TESTS:    Imaging Personally Reviewed:  [ ] YES  [ ] NO  acetaminophen   Tablet .. 650 milliGRAM(s) Oral every 6 hours PRN  amLODIPine   Tablet 5 milliGRAM(s) Oral daily  ceFAZolin   IVPB 2000 milliGRAM(s) IV Intermittent every 8 hours  enoxaparin Injectable 40 milliGRAM(s) SubCutaneous daily  influenza  Vaccine (HIGH DOSE) 0.7 milliLiter(s) IntraMuscular once  oxybutynin 5 milliGRAM(s) Oral two times a day  pantoprazole    Tablet 40 milliGRAM(s) Oral before breakfast  sodium chloride 0.65% Nasal 1 Spray(s) Both Nostrils four times a day PRN  valsartan 160 milliGRAM(s) Oral daily      HEALTH ISSUES - PROBLEM Dx:  Fever    HTN (hypertension)    HLD (hyperlipidemia)    DM (diabetes mellitus)    Cellulitis of skin

## 2021-09-15 NOTE — PROGRESS NOTE ADULT - ASSESSMENT
76 yo M with HTN, prior prostate biopsy (2 months ago), presenting with chills  Leukocytosis, fever  CXR clear  BCX GBS 2/4  LLE cellulitis  Overall,  1) GBS bacteremia  - Source in LLE cellulitis  - Cefazolin 2g q 8 (DC Vanco)  - Repeat BCXs until clear  - Check TTE  2) Leukocytosis  - Trend to normal  3) Cellulitis  - Monitor LLE for improvement    Jaun Belle MD  Pager [phone]  From 5pm-9am, and on weekends call [phone]

## 2021-09-15 NOTE — PROGRESS NOTE ADULT - ATTENDING COMMENTS
seen and examined  above noted and personally confirmed as accurate by me    PE unchanged except for new warmth and erythema left LE below the knee anteriorly  no new murmur on cardiac auscultation    blood cultures notably positive for Group B strept    ID help appreciated  continue Cefazolin IV 2 GM q 8    hemodynamically stable   echocardiogram   continue to follow ID recommendations    start home med Oxybutynin 5 BID  discussed with patient in detail, expresses understanding of treatment plans.  discussed with house staff in detail

## 2021-09-15 NOTE — PROGRESS NOTE ADULT - PROBLEM SELECTOR PLAN 2
Pt found to have warmth and redness on L lower extremity. Has hx of cellulitis in R leg. Currently clinically improving with normal vital signs, WBC downtrending, normal DVT study  - Continue Ancef  - Reassess L leg if pain worsens

## 2021-09-15 NOTE — PROGRESS NOTE ADULT - PROBLEM SELECTOR PLAN 1
Pt found to have BCx positive for GBS. Currently clinically improving with normal vital signs, WBC downtrending  - f/u repeat BCx, echocardiogram to r/o vegetations in setting of gram positive bacteremia   - Continue Ancef

## 2021-09-15 NOTE — PROGRESS NOTE ADULT - ASSESSMENT
76 y/o M with PMH of HTN, diet controlled DM, HLD recent prostate biopsy presenting with fevers of unclear etiology. Pt found to have BCx positive for GBS, s/p vancomycin and currently tx with Ancef. Tmax 100.2, WBC count downtrending, currently stable.

## 2021-09-15 NOTE — PROGRESS NOTE ADULT - SUBJECTIVE AND OBJECTIVE BOX
CC: F/U for Bacteremia    Saw/spoke to patient. No fevers, no chills. No new complaints.    Allergies  penicillin (Rash)    ANTIMICROBIALS:  ceFAZolin   IVPB 2000 every 8 hours    PE:    Vital Signs Last 24 Hrs  T(C): 36.5 (15 Sep 2021 06:14), Max: 37.9 (14 Sep 2021 18:56)  T(F): 97.7 (15 Sep 2021 06:14), Max: 100.2 (14 Sep 2021 18:56)  HR: 62 (15 Sep 2021 06:14) (62 - 86)  BP: 144/85 (15 Sep 2021 06:14) (114/85 - 144/85)  RR: 18 (15 Sep 2021 06:14) (18 - 18)  SpO2: 95% (15 Sep 2021 06:14) (95% - 98%)    Gen: AOx3, NAD, non-toxic  CV: S1+S2 normal, nontachycardic  Resp: Clear bilat, no resp distress, no crackles/wheezes  Abd: Soft, nontender, +BS  Ext: Worsening LLE redness/cellulitisBCX    LABS:                        12.7   4.00  )-----------( 137      ( 15 Sep 2021 07:43 )             38.0     09-15    137  |  103  |  14  ----------------------------<  143<H>  3.7   |  23  |  1.04    Ca    8.7      15 Sep 2021 07:43    TPro  6.7  /  Alb  4.2  /  TBili  0.5  /  DBili  x   /  AST  22  /  ALT  24  /  AlkPhos  117  09-13    Urinalysis Basic - ( 14 Sep 2021 06:00 )    Color: Light Yellow / Appearance: Clear / S.011 / pH: x  Gluc: x / Ketone: Negative  / Bili: Negative / Urobili: <2 mg/dL   Blood: x / Protein: Negative / Nitrite: Negative   Leuk Esterase: Negative / RBC: x / WBC x   Sq Epi: x / Non Sq Epi: x / Bacteria: x    MICROBIOLOGY:    .Blood Blood-Peripheral  21   Growth in anaerobic bottle: Gram Positive Cocci in Pairs and Chains  --  Blood Culture PCR    Clean Catch Clean Catch (Midstream)  21   No growth      Rapid RVP Result: NotDetec ( @ 20:02)  Rapid RVP Result: NotDetec ( @ 17:30)    (otherwise reviewed)    RADIOLOGY:    9/15 USG:    FINDINGS:    There is normal compressibility of the left common femoral, femoral and popliteal veins.  The contralateral common femoral vein is patent.  Doppler examination shows normal spontaneous and phasic flow.    No calf vein thrombosis is detected.    IMPRESSION:  No evidence of left lower extremity deep venous thrombosis.

## 2021-09-16 LAB
-  CEFTRIAXONE: SIGNIFICANT CHANGE UP
-  CLINDAMYCIN: SIGNIFICANT CHANGE UP
-  LEVOFLOXACIN: SIGNIFICANT CHANGE UP
-  PENICILLIN: SIGNIFICANT CHANGE UP
-  VANCOMYCIN: SIGNIFICANT CHANGE UP
ANION GAP SERPL CALC-SCNC: 11 MMOL/L — SIGNIFICANT CHANGE UP (ref 7–14)
BASOPHILS # BLD AUTO: 0.03 K/UL — SIGNIFICANT CHANGE UP (ref 0–0.2)
BASOPHILS NFR BLD AUTO: 0.8 % — SIGNIFICANT CHANGE UP (ref 0–2)
BUN SERPL-MCNC: 16 MG/DL — SIGNIFICANT CHANGE UP (ref 7–23)
CALCIUM SERPL-MCNC: 9.1 MG/DL — SIGNIFICANT CHANGE UP (ref 8.4–10.5)
CHLORIDE SERPL-SCNC: 102 MMOL/L — SIGNIFICANT CHANGE UP (ref 98–107)
CO2 SERPL-SCNC: 25 MMOL/L — SIGNIFICANT CHANGE UP (ref 22–31)
CREAT SERPL-MCNC: 1.05 MG/DL — SIGNIFICANT CHANGE UP (ref 0.5–1.3)
CULTURE RESULTS: SIGNIFICANT CHANGE UP
CULTURE RESULTS: SIGNIFICANT CHANGE UP
EOSINOPHIL # BLD AUTO: 0.14 K/UL — SIGNIFICANT CHANGE UP (ref 0–0.5)
EOSINOPHIL NFR BLD AUTO: 3.8 % — SIGNIFICANT CHANGE UP (ref 0–6)
GLUCOSE SERPL-MCNC: 125 MG/DL — HIGH (ref 70–99)
HCT VFR BLD CALC: 39.3 % — SIGNIFICANT CHANGE UP (ref 39–50)
HGB BLD-MCNC: 13.1 G/DL — SIGNIFICANT CHANGE UP (ref 13–17)
IANC: 2.32 K/UL — SIGNIFICANT CHANGE UP (ref 1.5–8.5)
IMM GRANULOCYTES NFR BLD AUTO: 0.3 % — SIGNIFICANT CHANGE UP (ref 0–1.5)
LYMPHOCYTES # BLD AUTO: 0.77 K/UL — LOW (ref 1–3.3)
LYMPHOCYTES # BLD AUTO: 21.2 % — SIGNIFICANT CHANGE UP (ref 13–44)
MAGNESIUM SERPL-MCNC: 2.5 MG/DL — SIGNIFICANT CHANGE UP (ref 1.6–2.6)
MCHC RBC-ENTMCNC: 30.7 PG — SIGNIFICANT CHANGE UP (ref 27–34)
MCHC RBC-ENTMCNC: 33.3 GM/DL — SIGNIFICANT CHANGE UP (ref 32–36)
MCV RBC AUTO: 92 FL — SIGNIFICANT CHANGE UP (ref 80–100)
METHOD TYPE: SIGNIFICANT CHANGE UP
METHOD TYPE: SIGNIFICANT CHANGE UP
MONOCYTES # BLD AUTO: 0.37 K/UL — SIGNIFICANT CHANGE UP (ref 0–0.9)
MONOCYTES NFR BLD AUTO: 10.2 % — SIGNIFICANT CHANGE UP (ref 2–14)
NEUTROPHILS # BLD AUTO: 2.32 K/UL — SIGNIFICANT CHANGE UP (ref 1.8–7.4)
NEUTROPHILS NFR BLD AUTO: 63.7 % — SIGNIFICANT CHANGE UP (ref 43–77)
NRBC # BLD: 0 /100 WBCS — SIGNIFICANT CHANGE UP
NRBC # FLD: 0 K/UL — SIGNIFICANT CHANGE UP
ORGANISM # SPEC MICROSCOPIC CNT: SIGNIFICANT CHANGE UP
PHOSPHATE SERPL-MCNC: 3.6 MG/DL — SIGNIFICANT CHANGE UP (ref 2.5–4.5)
PLATELET # BLD AUTO: 161 K/UL — SIGNIFICANT CHANGE UP (ref 150–400)
POTASSIUM SERPL-MCNC: 3.8 MMOL/L — SIGNIFICANT CHANGE UP (ref 3.5–5.3)
POTASSIUM SERPL-SCNC: 3.8 MMOL/L — SIGNIFICANT CHANGE UP (ref 3.5–5.3)
RBC # BLD: 4.27 M/UL — SIGNIFICANT CHANGE UP (ref 4.2–5.8)
RBC # FLD: 13.5 % — SIGNIFICANT CHANGE UP (ref 10.3–14.5)
SODIUM SERPL-SCNC: 138 MMOL/L — SIGNIFICANT CHANGE UP (ref 135–145)
SPECIMEN SOURCE: SIGNIFICANT CHANGE UP
SPECIMEN SOURCE: SIGNIFICANT CHANGE UP
WBC # BLD: 3.64 K/UL — LOW (ref 3.8–10.5)
WBC # FLD AUTO: 3.64 K/UL — LOW (ref 3.8–10.5)

## 2021-09-16 PROCEDURE — 99232 SBSQ HOSP IP/OBS MODERATE 35: CPT

## 2021-09-16 PROCEDURE — 93306 TTE W/DOPPLER COMPLETE: CPT | Mod: 26

## 2021-09-16 RX ORDER — CEFTRIAXONE 500 MG/1
2000 INJECTION, POWDER, FOR SOLUTION INTRAMUSCULAR; INTRAVENOUS EVERY 24 HOURS
Refills: 0 | Status: DISCONTINUED | OUTPATIENT
Start: 2021-09-16 | End: 2021-09-18

## 2021-09-16 RX ORDER — LACTOBACILLUS ACIDOPHILUS 100MM CELL
1 CAPSULE ORAL
Refills: 0 | Status: DISCONTINUED | OUTPATIENT
Start: 2021-09-16 | End: 2021-09-18

## 2021-09-16 RX ORDER — LORATADINE 10 MG/1
10 TABLET ORAL DAILY
Refills: 0 | Status: DISCONTINUED | OUTPATIENT
Start: 2021-09-16 | End: 2021-09-18

## 2021-09-16 RX ORDER — RIVAROXABAN 15 MG-20MG
10 KIT ORAL DAILY
Refills: 0 | Status: DISCONTINUED | OUTPATIENT
Start: 2021-09-17 | End: 2021-09-18

## 2021-09-16 RX ADMIN — ENOXAPARIN SODIUM 40 MILLIGRAM(S): 100 INJECTION SUBCUTANEOUS at 13:50

## 2021-09-16 RX ADMIN — PANTOPRAZOLE SODIUM 40 MILLIGRAM(S): 20 TABLET, DELAYED RELEASE ORAL at 06:51

## 2021-09-16 RX ADMIN — CEFTRIAXONE 100 MILLIGRAM(S): 500 INJECTION, POWDER, FOR SOLUTION INTRAMUSCULAR; INTRAVENOUS at 14:16

## 2021-09-16 RX ADMIN — Medication 100 MILLIGRAM(S): at 06:52

## 2021-09-16 RX ADMIN — LORATADINE 10 MILLIGRAM(S): 10 TABLET ORAL at 13:50

## 2021-09-16 RX ADMIN — Medication 5 MILLIGRAM(S): at 18:11

## 2021-09-16 RX ADMIN — Medication 1 TABLET(S): at 18:11

## 2021-09-16 RX ADMIN — Medication 5 MILLIGRAM(S): at 06:51

## 2021-09-16 RX ADMIN — AMLODIPINE BESYLATE 5 MILLIGRAM(S): 2.5 TABLET ORAL at 06:51

## 2021-09-16 RX ADMIN — VALSARTAN 160 MILLIGRAM(S): 80 TABLET ORAL at 06:51

## 2021-09-16 NOTE — PROGRESS NOTE ADULT - SUBJECTIVE AND OBJECTIVE BOX
Patient is a 75y old  Male who presents with a chief complaint of fever and chills    INTERVAL HPI/OVERNIGHT EVENTS: No reported overnight events. Pt c/o post nasal drip overnight with some cough in the morning. There is still redness of the L shin, but otherwise no other complaints. Pt feels well, is ambulating and tolerating PO intake.     REVIEW OF SYSTEMS:  CONSTITUTIONAL: No fever, weight loss, or fatigue  EYES: No eye pain, visual disturbances, or discharge  ENMT:  No difficulty hearing, tinnitus, vertigo; No sinus or throat pain  NECK: No pain or stiffness  BREASTS: No pain, masses, or nipple discharge  RESPIRATORY: No cough, wheezing, chills or hemoptysis; No shortness of breath  CARDIOVASCULAR: No chest pain, palpitations, dizziness, or leg swelling  GASTROINTESTINAL: No abdominal or epigastric pain. No nausea, vomiting, or hematemesis; No diarrhea or constipation. No melena or hematochezia.  GENITOURINARY: No dysuria, frequency, hematuria, or incontinence  NEUROLOGICAL: No headaches, memory loss, loss of strength, numbness, or tremors  SKIN: No itching, burning, rashes, or lesions   LYMPH NODES: No enlarged glands  ENDOCRINE: No heat or cold intolerance; No hair loss  MUSCULOSKELETAL: No joint pain or swelling; No muscle, back, or extremity pain  PSYCHIATRIC: No depression, anxiety, mood swings, or difficulty sleeping  HEME/LYMPH: No easy bruising, or bleeding gums  ALLERGY AND IMMUNOLOGIC: No hives or eczema    FAMILY HISTORY:  Family hx of prostate cancer (Father)    FH: type 2 diabetes (Father, Mother)    Vital Signs Last 24 Hrs  T(C): 36.9 (15 Sep 2021 21:22), Max: 36.9 (15 Sep 2021 21:22)  T(F): 98.5 (15 Sep 2021 21:22), Max: 98.5 (15 Sep 2021 21:22)  HR: 63 (15 Sep 2021 21:22) (63 - 67)  BP: 153/70 (15 Sep 2021 21:22) (126/82 - 153/70)  BP(mean): --  RR: 17 (15 Sep 2021 21:18) (17 - 17)  SpO2: 97% (15 Sep 2021 21:22) (95% - 99%)    ALLERGIES  penicillin (Rash)    PHYSICAL EXAM:  GENERAL: NAD, well-groomed, well-developed  HEAD:  Atraumatic, Normocephalic  EYES: EOMI, PERRLA, conjunctiva and sclera clear  ENMT: No tonsillar erythema, exudates, or enlargement; Moist mucous membranes, Good dentition, No lesions  NECK: Supple, No JVD, Normal thyroid  NERVOUS SYSTEM:  Alert & Oriented X3, Good concentration; Motor Strength 5/5 B/L upper and lower extremities; DTRs 2+ intact and symmetric. 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  CHEST/LUNG: Clear to percussion bilaterally; No rales, rhonchi, or rubs. Some scattered wheezing of RUL.  HEART: Regular rate and rhythm; No murmurs, rubs, or gallops  ABDOMEN: Soft, Nontender, Nondistended; Bowel sounds present  EXTREMITIES:  2+ Peripheral Pulses, No clubbing, cyanosis. 1+ pitting edema b/l  LYMPH: No lymphadenopathy noted  SKIN: No rashes or lesions. Erythematous and warm, raised area of L shin over tibia    Consultant(s) Notes Reviewed:  [x ] YES  [ ] NO  Care Discussed with Consultants/Other Providers [ x] YES  [ ] NO    LABS:                        13.1   3.64  )-----------( 161      ( 16 Sep 2021 07:54 )             39.3     09-16    138  |  102  |  16  ----------------------------<  125<H>  3.8   |  25  |  1.05    Ca    9.1      16 Sep 2021 07:54  Phos  3.6     09-16  Mg     2.50     09-16    RADIOLOGY & ADDITIONAL TESTS: N/A    Imaging Personally Reviewed:  [ ] YES  [x ] NO    MEDICATIONS  (STANDING):  amLODIPine   Tablet 5 milliGRAM(s) Oral daily  ceFAZolin   IVPB 2000 milliGRAM(s) IV Intermittent every 8 hours  enoxaparin Injectable 40 milliGRAM(s) SubCutaneous daily  influenza  Vaccine (HIGH DOSE) 0.7 milliLiter(s) IntraMuscular once  loratadine 10 milliGRAM(s) Oral daily  oxybutynin 5 milliGRAM(s) Oral two times a day  pantoprazole    Tablet 40 milliGRAM(s) Oral before breakfast  valsartan 160 milliGRAM(s) Oral daily    HEALTH ISSUES - PROBLEM Dx:  Fever    HTN (hypertension)    HLD (hyperlipidemia)    DM (diabetes mellitus)    Cellulitis of skin           Patient is a 75y old  Male who presents with a chief complaint of fever and chills    INTERVAL HPI/OVERNIGHT EVENTS: No reported overnight events. Pt c/o post nasal drip overnight with some cough in the morning. There is still redness of the L shin, but otherwise no other complaints. Pt feels well, is ambulating and tolerating PO intake.     REVIEW OF SYSTEMS:  CONSTITUTIONAL: No fever, weight loss, or fatigue  EYES: No eye pain, visual disturbances, or discharge  ENMT:  No difficulty hearing, tinnitus, vertigo; No sinus or throat pain  NECK: No pain or stiffness  BREASTS: No pain, masses, or nipple discharge  RESPIRATORY: No cough, wheezing, chills or hemoptysis; No shortness of breath  CARDIOVASCULAR: No chest pain, palpitations, dizziness, or leg swelling  GASTROINTESTINAL: No abdominal or epigastric pain. No nausea, vomiting, or hematemesis; No diarrhea or constipation. No melena or hematochezia.  GENITOURINARY: No dysuria, frequency, hematuria, or incontinence  NEUROLOGICAL: No headaches, memory loss, loss of strength, numbness, or tremors  SKIN: No itching, burning, rashes, or lesions   LYMPH NODES: No enlarged glands  ENDOCRINE: No heat or cold intolerance; No hair loss  MUSCULOSKELETAL: No joint pain or swelling; No muscle, back, or extremity pain  PSYCHIATRIC: No depression, anxiety, mood swings, or difficulty sleeping  HEME/LYMPH: No easy bruising, or bleeding gums  ALLERGY AND IMMUNOLOGIC: No hives or eczema    FAMILY HISTORY:  Family hx of prostate cancer (Father)    FH: type 2 diabetes (Father, Mother)    Vital Signs Last 24 Hrs  T(C): 36.9 (15 Sep 2021 21:22), Max: 36.9 (15 Sep 2021 21:22)  T(F): 98.5 (15 Sep 2021 21:22), Max: 98.5 (15 Sep 2021 21:22)  HR: 63 (15 Sep 2021 21:22) (63 - 67)  BP: 153/70 (15 Sep 2021 21:22) (126/82 - 153/70)  BP(mean): --  RR: 17 (15 Sep 2021 21:18) (17 - 17)  SpO2: 97% (15 Sep 2021 21:22) (95% - 99%)    ALLERGIES  penicillin (Rash)    PHYSICAL EXAM:  GENERAL: NAD, well-groomed, well-developed  HEAD:  Atraumatic, Normocephalic  EYES: EOMI, PERRLA, conjunctiva and sclera clear  ENMT: No tonsillar erythema, exudates, or enlargement; Moist mucous membranes, Good dentition, No lesions  NECK: Supple, No JVD, Normal thyroid  NERVOUS SYSTEM:  Alert & Oriented X3, Good concentration; Motor Strength 5/5 B/L upper and lower extremities; DTRs 2+ intact and symmetric. 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  CHEST/LUNG: Clear to percussion bilaterally; No rales, rhonchi, or rubs. Some scattered wheezing of RUL.  HEART: Regular rate and rhythm; No murmurs, rubs, or gallops  ABDOMEN: Soft, Nontender, Nondistended; Bowel sounds present  EXTREMITIES:  2+ Peripheral Pulses, No clubbing, cyanosis. 1+ pitting edema b/l. No increased pain on dorsiflexion of legs b/l  LYMPH: No lymphadenopathy noted  SKIN: No rashes or lesions. Erythematous and warm, raised area of L shin over tibia    Consultant(s) Notes Reviewed:  [x ] YES  [ ] NO  Care Discussed with Consultants/Other Providers [ x] YES  [ ] NO    LABS:                        13.1   3.64  )-----------( 161      ( 16 Sep 2021 07:54 )             39.3     09-16    138  |  102  |  16  ----------------------------<  125<H>  3.8   |  25  |  1.05    Ca    9.1      16 Sep 2021 07:54  Phos  3.6     09-16  Mg     2.50     09-16    RADIOLOGY & ADDITIONAL TESTS: N/A    Imaging Personally Reviewed:  [ ] YES  [x ] NO    MEDICATIONS  (STANDING):  amLODIPine   Tablet 5 milliGRAM(s) Oral daily  ceFAZolin   IVPB 2000 milliGRAM(s) IV Intermittent every 8 hours  enoxaparin Injectable 40 milliGRAM(s) SubCutaneous daily  influenza  Vaccine (HIGH DOSE) 0.7 milliLiter(s) IntraMuscular once  loratadine 10 milliGRAM(s) Oral daily  oxybutynin 5 milliGRAM(s) Oral two times a day  pantoprazole    Tablet 40 milliGRAM(s) Oral before breakfast  valsartan 160 milliGRAM(s) Oral daily    HEALTH ISSUES - PROBLEM Dx:  Fever    HTN (hypertension)    HLD (hyperlipidemia)    DM (diabetes mellitus)    Cellulitis of skin

## 2021-09-16 NOTE — PROGRESS NOTE ADULT - ATTENDING COMMENTS
seen and examined  above noted and personally confirmed as accurate by me    agree with above  PE notable for increased erythema over left shin    afebrile    continue cefazolin IV  echocardiogram pending  likely discharge on ceftriaxone IV x 2 weeks from last negative blood cultures via midline   if echocardiogram is in any way suspicious will need IRINA on Monday  locally erythema appears worse but that is likely typical for streptococcal infection even on appropriate treatment     discussed with patient in detail, expresses understanding of treatment plans.  discussed with patient's wife at bedside in detail  discussed with house staff in detail seen and examined  above noted and personally confirmed as accurate by me    agree with above  PE notable for increased erythema over left shin    afebrile    continue cefazolin IV  echocardiogram pending  likely discharge on ceftriaxone IV x 2 weeks from last negative blood cultures via midline   if echocardiogram is in any way suspicious will need IRINA on Monday  locally erythema appears worse but that is likely typical for streptococcal infection even on appropriate treatment   patient requested DVT prophylaxis be switched to rivaroxaban   will discontinue enoxaparin     discussed with patient in detail, expresses understanding of treatment plans.  discussed with patient's wife at bedside in detail  discussed with house staff in detail

## 2021-09-16 NOTE — PROGRESS NOTE ADULT - ASSESSMENT
76 yo M with HTN, prior prostate biopsy (2 months ago), presenting with chills  Leukocytosis, fever  CXR clear  BCX GBS 2/4  LLE cellulitis  Overall,  1) GBS bacteremia  - Source in LLE cellulitis  - Ceftriaxone 2g q 24  - F/U pending BCXs  - Check TTE  - If continued improvement and reassuring TTE, and BCX remain clear, plan is to send out on 2 weeks ceftriaxone from negative blood cultures, followed by surveillance BCXs 1 week later  - Would ensure BCX negative x 48 hours prior to placing midline  - Alternatively, if TTE concerning, may need IRINA  2) Leukocytosis  - Trend to normal  3) Cellulitis  - Monitor LLE for improvement    Jaun Belle MD  Pager [phone]  From 5pm-9am, and on weekends call [phone]

## 2021-09-16 NOTE — PROGRESS NOTE ADULT - PROBLEM SELECTOR PLAN 1
Pt found to have BCx positive for GBS. Currently clinically improving with normal vital signs, WBC downtrending  - f/u repeat BCx, echocardiogram to r/o vegetations in setting of gram positive bacteremia   - Continue Ancef Pt found to have BCx positive for GBS. Currently clinically improving with normal vital signs, WBC downtrending  - f/u repeat BCx  - f/u TTE  - Continue Ancef

## 2021-09-16 NOTE — PROGRESS NOTE ADULT - SUBJECTIVE AND OBJECTIVE BOX
CC: F/U for Bacteremia    Saw/spoke to patient. No fevers, no chills. No new complaints.    Allergies  penicillin (Rash)    ANTIMICROBIALS:  ceFAZolin   IVPB 2000 every 8 hours    PE:    Vital Signs Last 24 Hrs  T(C): 36.9 (15 Sep 2021 21:22), Max: 36.9 (15 Sep 2021 21:22)  T(F): 98.5 (15 Sep 2021 21:22), Max: 98.5 (15 Sep 2021 21:22)  HR: 63 (15 Sep 2021 21:22) (63 - 67)  BP: 153/70 (15 Sep 2021 21:22) (126/82 - 153/70)  RR: 17 (15 Sep 2021 21:18) (17 - 17)  SpO2: 97% (15 Sep 2021 21:22) (95% - 99%)    Gen: AOx3, NAD, non-toxic, pleasant  CV: S1+S2 normal, nontachycardic  Resp: Clear bilat, no resp distress, no crackles/wheezes  Abd: Soft, nontender, +BS  Ext: LLE redness improving    LABS:                        13.1   3.64  )-----------( 161      ( 16 Sep 2021 07:54 )             39.3     09-16    138  |  102  |  16  ----------------------------<  125<H>  3.8   |  25  |  1.05    Ca    9.1      16 Sep 2021 07:54  Phos  3.6     09-16  Mg     2.50     09-16    MICROBIOLOGY:    .Blood Blood  09-15-21   No growth to date.      .Blood  09-15-21   Babesia microti PCR  Results: NOT detected  ***************Result Note*************  The detection of Babesia microti by PCR has only been  validated for whole blood; this test has not been approved  by the [place] Food and Drug Administration (FDA). Performance  characteristics of this assay have been determined by  [employer]. The clinical significance  of results should be considered in conjunction with the  overall clinical presentation of the patient. Result is not  intended to be used as the sole means for clinical diagnosis  or patient management decisions.  One negative sample does not necessarily rule  out the presence of a parasitic infection.  --  --    .Blood Blood-Peripheral  09-14-21   Growth in anaerobic bottle: Streptococcus agalactiae (Group B)  See previous culture [id number]  --  Blood Culture PCR    Clean Catch Clean Catch (Midstream)  09-14-21   No growth     Rapid RVP Result: NotDetec (09-14 @ 20:02)  Rapid RVP Result: NotDetec (09-14 @ 17:30)    (otherwise reviewed)    RADIOLOGY:    9/15 USG:    FINDINGS:    There is normal compressibility of the left common femoral, femoral and popliteal veins.  The contralateral common femoral vein is patent.  Doppler examination shows normal spontaneous and phasic flow.    No calf vein thrombosis is detected.    IMPRESSION:  No evidence of left lower extremity deep venous thrombosis.

## 2021-09-16 NOTE — PROGRESS NOTE ADULT - ASSESSMENT
74 y/o M with PMH of HTN, diet controlled DM, HLD recent prostate biopsy presenting with fevers of unclear etiology. Pt found to have BCx positive for GBS, s/p vancomycin and currently tx with Ancef. Tmax 100.2, WBC count downtrending, currently stable. 74 y/o M with PMH of HTN, diet controlled DM, HLD recent prostate biopsy presenting with fevers of unclear etiology. Pt found to have BCx positive for GBS, s/p vancomycin and currently tx with Ancef. Tmax 98.5 overnight, WBC count downtrending, currently stable. BCx drawn yesterday prelim (-), will continue to follow for final result.

## 2021-09-17 ENCOUNTER — TRANSCRIPTION ENCOUNTER (OUTPATIENT)
Age: 76
End: 2021-09-17

## 2021-09-17 LAB
ANION GAP SERPL CALC-SCNC: 12 MMOL/L — SIGNIFICANT CHANGE UP (ref 7–14)
BASOPHILS # BLD AUTO: 0.05 K/UL — SIGNIFICANT CHANGE UP (ref 0–0.2)
BASOPHILS NFR BLD AUTO: 1.7 % — SIGNIFICANT CHANGE UP (ref 0–2)
BUN SERPL-MCNC: 20 MG/DL — SIGNIFICANT CHANGE UP (ref 7–23)
CALCIUM SERPL-MCNC: 9.1 MG/DL — SIGNIFICANT CHANGE UP (ref 8.4–10.5)
CHLORIDE SERPL-SCNC: 100 MMOL/L — SIGNIFICANT CHANGE UP (ref 98–107)
CO2 SERPL-SCNC: 24 MMOL/L — SIGNIFICANT CHANGE UP (ref 22–31)
CREAT SERPL-MCNC: 0.99 MG/DL — SIGNIFICANT CHANGE UP (ref 0.5–1.3)
EOSINOPHIL # BLD AUTO: 0.05 K/UL — SIGNIFICANT CHANGE UP (ref 0–0.5)
EOSINOPHIL NFR BLD AUTO: 1.8 % — SIGNIFICANT CHANGE UP (ref 0–6)
GIANT PLATELETS BLD QL SMEAR: PRESENT — SIGNIFICANT CHANGE UP
GLUCOSE SERPL-MCNC: 129 MG/DL — HIGH (ref 70–99)
HCT VFR BLD CALC: 37.9 % — LOW (ref 39–50)
HGB BLD-MCNC: 12.8 G/DL — LOW (ref 13–17)
IANC: 1.81 K/UL — SIGNIFICANT CHANGE UP (ref 1.5–8.5)
LYMPHOCYTES # BLD AUTO: 0.31 K/UL — LOW (ref 1–3.3)
LYMPHOCYTES # BLD AUTO: 10.4 % — LOW (ref 13–44)
MAGNESIUM SERPL-MCNC: 2.5 MG/DL — SIGNIFICANT CHANGE UP (ref 1.6–2.6)
MCHC RBC-ENTMCNC: 30.3 PG — SIGNIFICANT CHANGE UP (ref 27–34)
MCHC RBC-ENTMCNC: 33.8 GM/DL — SIGNIFICANT CHANGE UP (ref 32–36)
MCV RBC AUTO: 89.8 FL — SIGNIFICANT CHANGE UP (ref 80–100)
MONOCYTES # BLD AUTO: 0.23 K/UL — SIGNIFICANT CHANGE UP (ref 0–0.9)
MONOCYTES NFR BLD AUTO: 7.8 % — SIGNIFICANT CHANGE UP (ref 2–14)
NEUTROPHILS # BLD AUTO: 2.2 K/UL — SIGNIFICANT CHANGE UP (ref 1.8–7.4)
NEUTROPHILS NFR BLD AUTO: 73.1 % — SIGNIFICANT CHANGE UP (ref 43–77)
PHOSPHATE SERPL-MCNC: 3.8 MG/DL — SIGNIFICANT CHANGE UP (ref 2.5–4.5)
PLAT MORPH BLD: ABNORMAL
PLATELET # BLD AUTO: 170 K/UL — SIGNIFICANT CHANGE UP (ref 150–400)
PLATELET COUNT - ESTIMATE: NORMAL — SIGNIFICANT CHANGE UP
POTASSIUM SERPL-MCNC: 4.2 MMOL/L — SIGNIFICANT CHANGE UP (ref 3.5–5.3)
POTASSIUM SERPL-SCNC: 4.2 MMOL/L — SIGNIFICANT CHANGE UP (ref 3.5–5.3)
RBC # BLD: 4.22 M/UL — SIGNIFICANT CHANGE UP (ref 4.2–5.8)
RBC # FLD: 13.3 % — SIGNIFICANT CHANGE UP (ref 10.3–14.5)
RBC BLD AUTO: NORMAL — SIGNIFICANT CHANGE UP
SODIUM SERPL-SCNC: 136 MMOL/L — SIGNIFICANT CHANGE UP (ref 135–145)
VARIANT LYMPHS # BLD: 5.2 % — SIGNIFICANT CHANGE UP (ref 0–6)
WBC # BLD: 3.01 K/UL — LOW (ref 3.8–10.5)
WBC # FLD AUTO: 3.01 K/UL — LOW (ref 3.8–10.5)

## 2021-09-17 PROCEDURE — 99233 SBSQ HOSP IP/OBS HIGH 50: CPT

## 2021-09-17 PROCEDURE — 71275 CT ANGIOGRAPHY CHEST: CPT | Mod: 26

## 2021-09-17 RX ORDER — CEFTRIAXONE 500 MG/1
2 INJECTION, POWDER, FOR SOLUTION INTRAMUSCULAR; INTRAVENOUS
Qty: 24 | Refills: 0
Start: 2021-09-17 | End: 2021-09-28

## 2021-09-17 RX ORDER — SODIUM CHLORIDE 0.65 %
0 AEROSOL, SPRAY (ML) NASAL
Qty: 0 | Refills: 0 | DISCHARGE
Start: 2021-09-17

## 2021-09-17 RX ORDER — LORATADINE 10 MG/1
1 TABLET ORAL
Qty: 0 | Refills: 0 | DISCHARGE
Start: 2021-09-17

## 2021-09-17 RX ORDER — AMLODIPINE BESYLATE 2.5 MG/1
1 TABLET ORAL
Qty: 0 | Refills: 0 | DISCHARGE

## 2021-09-17 RX ORDER — CEFTRIAXONE 500 MG/1
0 INJECTION, POWDER, FOR SOLUTION INTRAMUSCULAR; INTRAVENOUS
Qty: 0 | Refills: 0 | DISCHARGE
Start: 2021-09-17

## 2021-09-17 RX ORDER — LACTOBACILLUS ACIDOPHILUS 100MM CELL
0 CAPSULE ORAL
Qty: 0 | Refills: 0 | DISCHARGE
Start: 2021-09-17

## 2021-09-17 RX ADMIN — PANTOPRAZOLE SODIUM 40 MILLIGRAM(S): 20 TABLET, DELAYED RELEASE ORAL at 06:49

## 2021-09-17 RX ADMIN — AMLODIPINE BESYLATE 5 MILLIGRAM(S): 2.5 TABLET ORAL at 06:48

## 2021-09-17 RX ADMIN — RIVAROXABAN 10 MILLIGRAM(S): KIT at 11:09

## 2021-09-17 RX ADMIN — Medication 650 MILLIGRAM(S): at 22:58

## 2021-09-17 RX ADMIN — VALSARTAN 160 MILLIGRAM(S): 80 TABLET ORAL at 06:49

## 2021-09-17 RX ADMIN — Medication 5 MILLIGRAM(S): at 17:27

## 2021-09-17 RX ADMIN — Medication 5 MILLIGRAM(S): at 06:48

## 2021-09-17 RX ADMIN — Medication 1 TABLET(S): at 06:48

## 2021-09-17 RX ADMIN — Medication 650 MILLIGRAM(S): at 22:28

## 2021-09-17 RX ADMIN — LORATADINE 10 MILLIGRAM(S): 10 TABLET ORAL at 11:09

## 2021-09-17 RX ADMIN — Medication 1 SPRAY(S): at 11:09

## 2021-09-17 RX ADMIN — Medication 1 TABLET(S): at 17:27

## 2021-09-17 RX ADMIN — CEFTRIAXONE 100 MILLIGRAM(S): 500 INJECTION, POWDER, FOR SOLUTION INTRAMUSCULAR; INTRAVENOUS at 13:52

## 2021-09-17 NOTE — CONSULT NOTE ADULT - TIME BILLING
agree with the above assessment and plan by BIANCA Perry  74 yo M with Incomplete RBBB, HTN, prior prostate biopsy (2 months ago), presenting with chills Leukocytosis, fever +  GBS bacteremia    #GBS bacteremia  -ID eval noted- likely source in LLE cellulitis  -s/p midline - IV abx  -Echo with no obvious signs of endocarditis but with aortic root dilation     #  Moderate aortic root dilatation  -Echo with root dilation measuring   (Ao: 4.7 cm).. with normal LV systolic function , mild AR   -pt asymptomatic  -Recommending CTA chest with contrast to eval root dilation prior to DC  -CTs c/s   -If CTA c/w echo findings recommend dc w outpt CTS followup for monitoring            .

## 2021-09-17 NOTE — PROVIDER CONTACT NOTE (OTHER) - ACTION/TREATMENT ORDERED:
ACP Nora spoke to pt's wife on the phone. Pt's wife discussed with ACP her concerns.
Pt is on antibiotics.
Benefits outweighed risks, OK to proceed with midline when blood cultures neg for 48hrs.

## 2021-09-17 NOTE — PROVIDER CONTACT NOTE (OTHER) - SITUATION
Pt reported that his left leg is red and before the right leg was red and not it is not. he aslo stated that he has slight pain at left leg now. pt requested thar provider to assess him.
Pt requested to be tested for lyme disease and also would like echo to r/o endocarditis
midline insertion for IV antibiotics for dc

## 2021-09-17 NOTE — PROGRESS NOTE ADULT - ASSESSMENT
76 y/o M with PMH of HTN, diet controlled DM, HLD recent prostate biopsy presenting with fevers of unclear etiology. Pt found to have BCx positive for GBS, s/p vancomycin and currently tx with Ancef. Tmax 98.6 overnight, WBC count downtrending, currently stable. BCx drawn 9/15 prelim (-), will continue to follow for final result.

## 2021-09-17 NOTE — PROGRESS NOTE ADULT - PROBLEM SELECTOR PLAN 2
Pt found to have warmth and redness on L lower extremity. Has hx of cellulitis in R leg. Currently clinically improving with normal vital signs, WBC downtrending, normal DVT study  - Continue Ancef  - Reassess L leg if pain worsens Pt found to have warmth and redness on L lower extremity. Has hx of cellulitis in R leg. Currently clinically improving with normal vital signs, WBC downtrending, normal DVT study, echo grossly unremarkable  - Reassess L leg if pain worsens  - Management as above

## 2021-09-17 NOTE — PROVIDER CONTACT NOTE (OTHER) - REASON
midline insertion
Pt requested to be tested for lyme disease and also would like echo to r/o endocarditis
Pt reported that his left leg is red and before the right leg was red and not it is not. he aslo stated that he has slight pain at left leg now. pt requested thar provider to assess him.

## 2021-09-17 NOTE — PROVIDER CONTACT NOTE (OTHER) - ASSESSMENT
pt on xarelto, blood cultures pending until approx 10AM. Platelets from 9/17 170, INR 1.13 from 9/13.
left leg is red. no welling noted.  right leg is not red. +2 swelling noted.

## 2021-09-17 NOTE — CONSULT NOTE ADULT - ASSESSMENT
74 yo M with HTN, prior prostate biopsy (2 months ago), presenting with chills  Leukocytosis, fever  Some nasal congestion  CXR clear  LFTs WNL  UA negative  Recent cellulitis, resolved  Diarrhea x1  Some outdoor exposure but uncertain tick  Overall,  1) Fever/Chills  - Unclear etiology, consider occult bacteremia  - Monitor off abx for present time (low threshold to restart if clinical worsening)  - F/U pending BCXs  - Monitor for further fevers  - Consideration for CTs if no specific etiology found  - Check RVP  2) Leukocytosis  - Trend to normal  3) Cellulitis  - Appears resolved with PO abx    Jaun Belle MD  Pager [phone]  From 5pm-9am, and on weekends call [phone]
Febrile illness, recent leg cellulitis partially treated as outpt with po abx.     No present evidence for acute pulmonary process
Echo 9/16/21: EF 65%,  Moderate aortic root dilatation  (Ao: 4.7 cm).. Normal left ventricular systolic function. No segmental wall motion abnormalities, mild AR    a/p     76 yo M with Incomplete RBBB, HTN, prior prostate biopsy (2 months ago), presenting with chills Leukocytosis, fever +  GBS bacteremia    #GBS bacteremia  -ID eval noted- likely source in LLE cellulitis  -s/p midline - IV abx  -Echo with no obvious signs of endocarditis but with aortic root dilation     #  Moderate aortic root dilatation  -Echo with root dilation measuring   (Ao: 4.7 cm).. with normal LV systolic function , mild AR   -Recommending CT chest with contrast to eval root dilation  -CTs c/s     #LLE cellulitis  no dvt on US   -abx, management per ID       DVT ppx   plan discussed in detail with pt, wide and medical team     .

## 2021-09-17 NOTE — ADVANCED PRACTICE NURSE CONSULT - ASSESSMENT
Patient is aware and alert. Midline insertion along with risks, benefits, possible complications and infection prevention explained to patient who verbalized understanding. All questions addressed and patient gave verbal consent to place midline. Left arm cleansed with CHG. Using sterile technique under ultra sound guidance, placed PowerGlide Pro Midline 20G /10cm into Left Basilic vein. Brisk blood return and flushed with 20Mls of normal saline. Minimal blood loss and patient tolerated procedure well. CHG dressing placed. All sharps accounted for. Report given to district RN and ordering provider. LOT#: [id number] , REF#: [id number]

## 2021-09-17 NOTE — CONSULT NOTE ADULT - SUBJECTIVE AND OBJECTIVE BOX
CARDIOLOGY CONSULT - Dr. Todd         HPI:  76 y/o M with PMH of HTN, recent prostate biopsy 2 months ago, HLD, pre-DM presenting with fevers. he was found to have GBS bacteremia, source in LLE cellulitis  Echo revealed Moderate aortic root dilatation  (Ao: 4.7 cm) . Normal left ventricular systolic function. No segmental wall motion abnormalities. No evidence of endocarditis mentioned.   He denies any chest pain, dizziness or cardiac hx. ROS otherwise negative      PAST MEDICAL & SURGICAL HISTORY:  HTN (hypertension)    DM (diabetes mellitus)    HLD (hyperlipidemia)    No significant past surgical history            PREVIOUS DIAGNOSTIC TESTING:    [ ] Echocardiogram:  [ ]  Catheterization:  [ ] Stress Test:  	    MEDICATIONS:  Home Medications:  Fioricet oral tablet: 1 tab(s) orally 1 to 2 times a day, As Needed (15 Sep 2021 11:26)  lactobacillus acidophilus oral capsule:  orally  (17 Sep 2021 09:41)  loratadine 10 mg oral tablet: 1 tab(s) orally once a day (17 Sep 2021 09:41)  metFORMIN 500 mg oral tablet, extended release: 1 tab(s) orally 2 times a day (15 Sep 2021 11:26)  oxybutynin 15 mg/24 hr oral tablet, extended release: 1 tab(s) orally once a day (15 Sep 2021 11:26)  Protonix 40 mg oral delayed release tablet: 1 tab(s) orally once a day (15 Sep 2021 11:26)  sodium chloride 0.65% nasal spray:  nasal  (17 Sep 2021 09:41)  valsartan 160 mg oral tablet: 1 tab(s) orally once a day (15 Sep 2021 11:26)      MEDICATIONS  (STANDING):  amLODIPine   Tablet 5 milliGRAM(s) Oral daily  cefTRIAXone   IVPB 2000 milliGRAM(s) IV Intermittent every 24 hours  influenza  Vaccine (HIGH DOSE) 0.7 milliLiter(s) IntraMuscular once  lactobacillus acidophilus 1 Tablet(s) Oral two times a day  loratadine 10 milliGRAM(s) Oral daily  oxybutynin 5 milliGRAM(s) Oral two times a day  pantoprazole    Tablet 40 milliGRAM(s) Oral before breakfast  rivaroxaban 10 milliGRAM(s) Oral daily  valsartan 160 milliGRAM(s) Oral daily      FAMILY HISTORY:  Family hx of prostate cancer (Father)    FH: type 2 diabetes (Father, Mother)        SOCIAL HISTORY:    [x ] Non-smoker  [ ] Smoker  [ ] Alcohol    Allergies    penicillin (Rash)    Intolerances    	    REVIEW OF SYSTEMS:  CONSTITUTIONAL: No fever, weight loss, or fatigue  EYES: No eye pain, visual disturbances, or discharge  ENMT:  No difficulty hearing, tinnitus, vertigo; No sinus or throat pain  NECK: No pain or stiffness  RESPIRATORY: No cough, wheezing, chills or hemoptysis; No Shortness of Breath  CARDIOVASCULAR: No chest pain, palpitations, passing out, dizziness, or leg swelling  GASTROINTESTINAL: No abdominal or epigastric pain. No nausea, vomiting, or hematemesis; No diarrhea or constipation. No melena or hematochezia.  GENITOURINARY: No dysuria, frequency, hematuria, or incontinence  NEUROLOGICAL: No headaches, memory loss, loss of strength, numbness, or tremors  SKIN: No itching, burning, rashes, or lesions   	    [x ] All others negative	  [ ] Unable to obtain    PHYSICAL EXAM:  T(C): 36.3 (09-17-21 @ 06:40), Max: 37 (09-16-21 @ 22:24)  HR: 72 (09-17-21 @ 06:40) (64 - 78)  BP: 134/97 (09-17-21 @ 06:40) (121/85 - 134/97)  RR: 16 (09-17-21 @ 06:40) (16 - 17)  SpO2: 96% (09-17-21 @ 06:40) (96% - 97%)  Wt(kg): --  I&O's Summary    17 Sep 2021 07:01  -  17 Sep 2021 13:20  --------------------------------------------------------  IN: 400 mL / OUT: 0 mL / NET: 400 mL        Appearance: Normal	  Psychiatry: A & O x 3, Mood & affect appropriate  HEENT:   Normal oral mucosa, PERRL, EOMI	  Lymphatic: No lymphadenopathy  Cardiovascular: Normal S1 S2,RRR, No JVD, No murmurs  Respiratory: Lungs clear to auscultation	  Gastrointestinal:  Soft, Non-tender, + BS	  Skin: No rashes, No ecchymoses, No cyanosis	  Neurologic: Non-focal  Extremities: Normal range of motion, No clubbing, cyanosis or edema  Vascular: Peripheral pulses palpable 2+ bilaterally    TELEMETRY: 	    ECG:  nsr  incom Rbbb	  RADIOLOGY:  < from: Transthoracic Echocardiogram (09.16.21 @ 16:12) >  Ejection Fraction (Teicholtz): 65 %  ------------------------------------------------------------------------  OBSERVATIONS:  Mitral Valve: Normal mitral valve.  Aortic Root: Moderate aortic root dilatation  (Ao: 4.7 cm).  Aortic Valve: Calcified trileaflet aortic valve with normal  opening. Mild aortic regurgitation.  Left Atrium: Normal left atrium.  LA volume index = 15  cc/m2.  Left Ventricle: Normal left ventricular systolic function.  No segmental wall motion abnormalities. Normal left  ventricular internal dimensions and wall thicknesses.  (DT:183 ms).  Right Heart:Normal right atrium. Normal right ventricular  size and function. Normal tricuspid valve. Normal pulmonic  valve. Mild pulmonic regurgitation.  Pericardium/PleuraNormal pericardium with no pericardial  effusion.  ------------------------------------------------------------------------  CONCLUSIONS:  1. Calcified trileaflet aortic valve with normal opening.  Mild aortic regurgitation.  2. Moderate aortic root dilatation  (Ao: 4.7 cm).  3. Normal left ventricular systolic function. No segmental  wall motion abnormalities.  4. Normal right ventricular size and function.  ------------------------------------------------------------------------  Confirmed on  9/16/2021 - 18:44:02 by WILVER Betts  ------------------------------------------------------------------------    < end of copied text >    OTHER: 	  	  LABS:	 	    CARDIAC MARKERS:                                  12.8   3.01  )-----------( 170      ( 17 Sep 2021 07:58 )             37.9     09-17    136  |  100  |  20  ----------------------------<  129<H>  4.2   |  24  |  0.99    Ca    9.1      17 Sep 2021 07:58  Phos  3.8     09-17  Mg     2.50     09-17        proBNP:   Lipid Profile:   HgA1c:   TSH:       
"HPI:  74 y/o M with PMH of HTN, recent prostate biopsy 2 months ago, HLD, pre-DM presenting with fevers. 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 He states symptoms were worse after car ride to [place]. Admits to b/l LE edema, states is around baseline. No dysuria at all. + 3 episodes of loose stools yesterday last BM yesterday. Took Pepto-Bismol with resolution of diarrhea   (14 Sep 2021 10:40)"    Above reviewed. 74 yo M with HTN, prior prostate biopsy, presenting with chills.    Episode cellulitis recently Doxy/Cipro into Keflex had improvement  Starting 1 day ago had re-onset of chills, low grade fevers  Only recent dental work was re-placement of crown  No urinary symptoms, no pain at prostate  No pulmonary symptoms  No specific tick exposure but recent outdoor exposure  Episodes diarrhea, now resolved  Today, back to baseline, but uncertain cause chills    PAST MEDICAL & SURGICAL HISTORY:  HTN (hypertension)    DM (diabetes mellitus)    HLD (hyperlipidemia)    No significant past surgical history    Allergies    penicillin (Rash)    ANTIMICROBIALS:  Off    OTHER MEDS:  amLODIPine   Tablet 5 milliGRAM(s) Oral daily  enoxaparin Injectable 40 milliGRAM(s) SubCutaneous daily  valsartan 160 milliGRAM(s) Oral daily    SOCIAL HISTORY: No tobacco, no alcohol, no illicit drugs    FAMILY HISTORY:  Family hx of prostate cancer (Father)    FH: type 2 diabetes (Father, Mother)    Drug Dosing Weight    PE:    Vital Signs Last 24 Hrs  T(C): 36.7 (14 Sep 2021 12:49), Max: 38.5 (14 Sep 2021 00:01)  T(F): 98 (14 Sep 2021 12:49), Max: 101.3 (14 Sep 2021 00:01)  HR: 72 (14 Sep 2021 12:49) (67 - 109)  BP: 145/98 (14 Sep 2021 12:49) (103/87 - 145/98)  RR: 16 (14 Sep 2021 12:49) (16 - 20)  SpO2: 98% (14 Sep 2021 12:49) (97% - 100%)    Gen: AOx3, NAD, non-toxic, pleasant  CV: S1+S2 normal, nontachycardic  Resp: Clear bilat, no resp distress, no crackles/wheezes  Abd: Soft, nontender, +BS  Ext: No LE edema, no wounds  : No Flores  IV/Skin: No thrombophlebitis  Msk: No low back pain, no arthralgias, no joint swelling  Neuro: No sensory deficits, no motor deficits    LABS:                        13.5   11.12 )-----------( 165      ( 13 Sep 2021 23:40 )             41.0         135  |  99  |  21  ----------------------------<  170<H>  4.1   |  21<L>  |  1.19    Ca    8.9      13 Sep 2021 23:40    TPro  6.7  /  Alb  4.2  /  TBili  0.5  /  DBili  x   /  AST  22  /  ALT  24  /  AlkPhos  117      Urinalysis Basic - ( 14 Sep 2021 06:00 )    Color: Light Yellow / Appearance: Clear / S.011 / pH: x  Gluc: x / Ketone: Negative  / Bili: Negative / Urobili: <2 mg/dL   Blood: x / Protein: Negative / Nitrite: Negative   Leuk Esterase: Negative / RBC: x / WBC x   Sq Epi: x / Non Sq Epi: x / Bacteria: x    MICROBIOLOGY:     XR:    FINDINGS:    Cardiac size is within normal limits.  The lungs are clear.  There are no pleural effusions. No pneumothorax.  Degenerative changes of thoracic spine.    IMPRESSION:  Clear lungs.     USG:    FINDINGS:    There is normal compressibility of the right common femoral, femoral and popliteal veins.  The contralateral common femoral vein is patent.  Doppler examination shows normal spontaneous and phasic flow.    No calf vein thrombosis is detected.    IMPRESSION:  No evidence of right lower extremity deep venous thrombosis.            RADIOLOGY:    
Self treated recently  with po abx *cipro + doxy, then doxy + Keflex) for cellulitis leg    Has been bow and arrow shooting on [facility]    No dyspnea, cough, wheezing.     No chest pains.     Tm 101.2 upon admission  Neck supple  Chest Bilateral breath sounds  Cor:  RR, No m,g,r  Abd: Soft, BS+ Nontender  Ext.  LE: Nontender.  Stasis changes b/l with minima edema      CXR port No infs or effusions.

## 2021-09-17 NOTE — DISCHARGE NOTE PROVIDER - CARE PROVIDER_API CALL
Jaun Belle)  Infectious Disease; Internal Medicine  [address] [place] [address]  Phone: [phone]  Fax: [phone]  Follow Up Time: 1 week   Jaun Belle)  Infectious Disease; Internal Medicine  [address] [place] [address]  Phone: [phone]  Fax: [phone]  Follow Up Time: 1 week    Babita Ryan)  Surgery; Thoracic and Cardiac Surgery  [address] [place] [id number]  Phone: [phone]  Fax: [phone]  Follow Up Time: 1 week

## 2021-09-17 NOTE — PROGRESS NOTE ADULT - PROBLEM SELECTOR PLAN 6
Diet: Consistent Carbohydrate  DVT: lovenox 40  Dispo: home Diet: Consistent Carbohydrate  DVT: xarelto 10  Dispo: home

## 2021-09-17 NOTE — PROGRESS NOTE ADULT - ATTENDING COMMENTS
seen and examined  above noted and personally confirmed as accurate by me  no diarrhea     PE unchanged except for improved LLE erythema and pain    echocardiogram noted  mod dilatation of aortic root  unlikely to be involved in infectious process    cardiology evaluation noted  CTA to look at aortic root   if similar as echocardiogram finding will discharge home for CTS outpatient follow up  patient any way desirous of follow up with [facility] cardiology     discussed with patient in detail, expresses understanding of treatment plans.  discussed with patient's wife at bedside in detail  discussed with  cardiology   d/w house staff in detail     if discharged will resume outpatient home meds

## 2021-09-17 NOTE — DISCHARGE NOTE PROVIDER - CARE PROVIDERS DIRECT ADDRESSES
,[email or web address]

## 2021-09-17 NOTE — PROGRESS NOTE ADULT - SUBJECTIVE AND OBJECTIVE BOX
CC: F/U Bacteremia    Saw/spoke to patient. Patient well appearing. No new complaints.    Allergies  penicillin (Rash)    ANTIMICROBIALS:  cefTRIAXone   IVPB 2000 every 24 hours    PE:    Vital Signs Last 24 Hrs  T(C): 36.3 (17 Sep 2021 06:40), Max: 37 (16 Sep 2021 22:24)  T(F): 97.3 (17 Sep 2021 06:40), Max: 98.6 (16 Sep 2021 22:24)  HR: 72 (17 Sep 2021 06:40) (64 - 78)  BP: 134/97 (17 Sep 2021 06:40) (121/85 - 134/97)  RR: 16 (17 Sep 2021 06:40) (16 - 17)  SpO2: 96% (17 Sep 2021 06:40) (96% - 97%)    Gen: AOx3, NAD, non-toxic  CV: S1+S2 normal, nontachycardic  Resp: Clear bilat, no resp distress, no crackles/wheezes  Abd: Soft, nontender, +BS  Ext: No LE edema, no wounds    LABS:                        12.8   3.01  )-----------( 170      ( 17 Sep 2021 07:58 )             37.9     09-17    136  |  100  |  20  ----------------------------<  129<H>  4.2   |  24  |  0.99    Ca    9.1      17 Sep 2021 07:58  Phos  3.8     09-17  Mg     2.50     09-17    MICROBIOLOGY:    .Blood Blood  09-15-21   No growth to date.    .Blood  09-15-21   Babesia microti PCR  Results: NOT detected  ***************Result Note*************  The detection of Babesia microti by PCR has only been  validated for whole blood; this test has not been approved  by the US Food and Drug Administration (FDA). Performance  characteristics of this assay have been determined by  [employer]. The clinical significance  of results should be considered in conjunction with the  overall clinical presentation of the patient. Result is not  intended to be used as the sole means for clinical diagnosis  or patient management decisions.  One negative sample does not necessarily rule  out the presence of a parasitic infection.  --  --    Clean Catch Clean Catch (Midstream)  09-14-21   No growth     .Blood Blood-Peripheral  09-13-21   Growth in aerobic bottle: Streptococcus agalactiae (Group B)  ***Blood Panel PCR results on this specimen are available  approximately 3 hours after the Gram stain result.***  Gram stain, PCR, and/or culture results may not always  correspond due to difference in methodologies.  ************************************************************  This PCR assay was performed by multiplex PCR. This  Assay tests for 66 bacterial and resistance gene targets.  Please refer to the [facility] test directory  at [email or web address] for details.  --  Blood Culture PCR  Streptococcus agalactiae (Group B)    .Blood Blood-Peripheral  09-13-21   Growth in anaerobic bottle: Streptococcus agalactiae (Group B)  See previous culture [id number]  --    Growth in anaerobic bottle: Gram Positive Cocci in Pairs and Chains    Rapid RVP Result: NotDetec (09-14 @ 20:02)  Rapid RVP Result: NotDetec (09-14 @ 17:30)    (otherwise reviewed)    RADIOLOGY:    9/16 TTE:    CONCLUSIONS:  1. Calcified trileaflet aortic valve with normal opening.  Mild aortic regurgitation.  2. Moderate aortic root dilatation  (Ao: 4.7 cm).  3. Normal left ventricular systolic function. No segmental  wall motion abnormalities.  4. Normal right ventricular size and function.

## 2021-09-17 NOTE — PROGRESS NOTE ADULT - SUBJECTIVE AND OBJECTIVE BOX
Patient is a 75y old  Male who presents with a chief complaint of fever and chills    INTERVAL HPI/OVERNIGHT EVENTS: No reported overnight events.     REVIEW OF SYSTEMS:  CONSTITUTIONAL: No fever, weight loss, or fatigue  EYES: No eye pain, visual disturbances, or discharge  ENMT:  No difficulty hearing, tinnitus, vertigo; No sinus or throat pain  NECK: No pain or stiffness  BREASTS: No pain, masses, or nipple discharge  RESPIRATORY: No cough, wheezing, chills or hemoptysis; No shortness of breath  CARDIOVASCULAR: No chest pain, palpitations, dizziness, or leg swelling  GASTROINTESTINAL: No abdominal or epigastric pain. No nausea, vomiting, or hematemesis; No diarrhea or constipation. No melena or hematochezia.  GENITOURINARY: No dysuria, frequency, hematuria, or incontinence  NEUROLOGICAL: No headaches, memory loss, loss of strength, numbness, or tremors  SKIN: No itching, burning, rashes, or lesions   LYMPH NODES: No enlarged glands  ENDOCRINE: No heat or cold intolerance; No hair loss  MUSCULOSKELETAL: No joint pain or swelling; No muscle, back, or extremity pain  PSYCHIATRIC: No depression, anxiety, mood swings, or difficulty sleeping  HEME/LYMPH: No easy bruising, or bleeding gums  ALLERGY AND IMMUNOLOGIC: No hives or eczema    FAMILY HISTORY:  Family hx of prostate cancer (Father)    FH: type 2 diabetes (Father, Mother)    Vital Signs Last 24 Hrs  T(C): 36.3 (17 Sep 2021 06:40), Max: 37 (16 Sep 2021 22:24)  T(F): 97.3 (17 Sep 2021 06:40), Max: 98.6 (16 Sep 2021 22:24)  HR: 72 (17 Sep 2021 06:40) (64 - 78)  BP: 134/97 (17 Sep 2021 06:40) (121/85 - 134/97)  BP(mean): --  RR: 16 (17 Sep 2021 06:40) (16 - 17)  SpO2: 96% (17 Sep 2021 06:40) (96% - 97%)    ALLERGIES  penicillin (Rash)    PHYSICAL EXAM:  GENERAL: NAD, well-groomed, well-developed  HEAD:  Atraumatic, Normocephalic  EYES: EOMI, PERRLA, conjunctiva and sclera clear  ENMT: No tonsillar erythema, exudates, or enlargement; Moist mucous membranes, Good dentition, No lesions  NECK: Supple, No JVD, Normal thyroid  NERVOUS SYSTEM:  Alert & Oriented X3, Good concentration; Motor Strength 5/5 B/L upper and lower extremities; DTRs 2+ intact and symmetric. 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  CHEST/LUNG: Clear to percussion bilaterally; No rales, rhonchi, or rubs. Some scattered wheezing of RUL.  HEART: Regular rate and rhythm; No murmurs, rubs, or gallops  ABDOMEN: Soft, Nontender, Nondistended; Bowel sounds present  EXTREMITIES:  2+ Peripheral Pulses, No clubbing, cyanosis. 1+ pitting edema b/l. No increased pain on dorsiflexion of legs b/l  LYMPH: No lymphadenopathy noted  SKIN: No rashes or lesions. Erythematous and warm, raised area of L shin over tibia    Consultant(s) Notes Reviewed:  [x ] YES  [ ] NO  Care Discussed with Consultants/Other Providers [ x] YES  [ ] NO    LABS:                 RADIOLOGY & ADDITIONAL TESTS: N/A    Imaging Personally Reviewed:  [ ] YES  [x ] NO    MEDICATIONS  (STANDING):  amLODIPine   Tablet 5 milliGRAM(s) Oral daily  ceFAZolin   IVPB 2000 milliGRAM(s) IV Intermittent every 8 hours  enoxaparin Injectable 40 milliGRAM(s) SubCutaneous daily  influenza  Vaccine (HIGH DOSE) 0.7 milliLiter(s) IntraMuscular once  loratadine 10 milliGRAM(s) Oral daily  oxybutynin 5 milliGRAM(s) Oral two times a day  pantoprazole    Tablet 40 milliGRAM(s) Oral before breakfast  valsartan 160 milliGRAM(s) Oral daily    HEALTH ISSUES - PROBLEM Dx:  Fever    HTN (hypertension)    HLD (hyperlipidemia)    DM (diabetes mellitus)    Cellulitis of skin           Patient is a 75y old  Male who presents with a chief complaint of fever and chills    INTERVAL HPI/OVERNIGHT EVENTS: No reported overnight events. Pt denied any issues overnight. He feels that the pain and redness of his leg is getting better. Results of the echo were WNL yesterday. Midline to be placed today.     REVIEW OF SYSTEMS:  CONSTITUTIONAL: No fever, weight loss, or fatigue  EYES: No eye pain, visual disturbances, or discharge  ENMT:  No difficulty hearing, tinnitus, vertigo; No sinus or throat pain  NECK: No pain or stiffness  BREASTS: No pain, masses, or nipple discharge  RESPIRATORY: No cough, wheezing, chills or hemoptysis; No shortness of breath  CARDIOVASCULAR: No chest pain, palpitations, dizziness, or leg swelling  GASTROINTESTINAL: No abdominal or epigastric pain. No nausea, vomiting, or hematemesis; No diarrhea or constipation. No melena or hematochezia.  GENITOURINARY: No dysuria, frequency, hematuria, or incontinence  NEUROLOGICAL: No headaches, memory loss, loss of strength, numbness, or tremors  SKIN: No itching, burning, rashes, or lesions   LYMPH NODES: No enlarged glands  ENDOCRINE: No heat or cold intolerance; No hair loss  MUSCULOSKELETAL: No joint pain or swelling; No muscle, back, or extremity pain  PSYCHIATRIC: No depression, anxiety, mood swings, or difficulty sleeping  HEME/LYMPH: No easy bruising, or bleeding gums  ALLERGY AND IMMUNOLOGIC: No hives or eczema    FAMILY HISTORY:  Family hx of prostate cancer (Father)    FH: type 2 diabetes (Father, Mother)    Vital Signs Last 24 Hrs  T(C): 36.3 (17 Sep 2021 06:40), Max: 37 (16 Sep 2021 22:24)  T(F): 97.3 (17 Sep 2021 06:40), Max: 98.6 (16 Sep 2021 22:24)  HR: 72 (17 Sep 2021 06:40) (64 - 78)  BP: 134/97 (17 Sep 2021 06:40) (121/85 - 134/97)  BP(mean): --  RR: 16 (17 Sep 2021 06:40) (16 - 17)  SpO2: 96% (17 Sep 2021 06:40) (96% - 97%)    ALLERGIES  penicillin (Rash)    PHYSICAL EXAM:  GENERAL: NAD, well-groomed, well-developed  HEAD:  Atraumatic, Normocephalic  EYES: EOMI, PERRLA, conjunctiva and sclera clear  ENMT: No tonsillar erythema, exudates, or enlargement; Moist mucous membranes, Good dentition, No lesions  NECK: Supple, No JVD, Normal thyroid  NERVOUS SYSTEM:  Alert & Oriented X3, Good concentration; Motor Strength 5/5 B/L upper and lower extremities; DTRs 2+ intact and symmetric. 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  CHEST/LUNG: Clear to percussion bilaterally; No rales, rhonchi, or rubs. Some scattered wheezing of RUL.  HEART: Regular rate and rhythm; No murmurs, rubs, or gallops  ABDOMEN: Soft, Nontender, Nondistended; Bowel sounds present  EXTREMITIES:  2+ Peripheral Pulses, No clubbing, cyanosis. 1+ pitting edema b/l. No increased pain on dorsiflexion of legs b/l  LYMPH: No lymphadenopathy noted  SKIN: No rashes or lesions. Erythematous and warm, raised area of L shin over tibia    Consultant(s) Notes Reviewed:  [x ] YES  [ ] NO  Care Discussed with Consultants/Other Providers [ x] YES  [ ] NO    LABS:                        12.8   3.01  )-----------( 170      ( 17 Sep 2021 07:58 )             37.9     09-17    136  |  100  |  20  ----------------------------<  129<H>  4.2   |  24  |  0.99    Ca    9.1      17 Sep 2021 07:58  Phos  3.8     09-17  Mg     2.50     09-17    RADIOLOGY & ADDITIONAL TESTS:   Transthoracic Echocardiogram (09.16.21 @ 16:12)  CONCLUSIONS:  1. Calcified trileaflet aortic valve with normal opening.  Mild aortic regurgitation.  2. Moderate aortic root dilatation  (Ao: 4.7 cm).  3. Normal left ventricular systolic function. No segmental  wall motion abnormalities.  4. Normal right ventricular size and function.      Imaging Personally Reviewed:  [ ] YES  [x ] NO    MEDICATIONS  (STANDING):  amLODIPine   Tablet 5 milliGRAM(s) Oral daily  ceFAZolin   IVPB 2000 milliGRAM(s) IV Intermittent every 8 hours  enoxaparin Injectable 40 milliGRAM(s) SubCutaneous daily  influenza  Vaccine (HIGH DOSE) 0.7 milliLiter(s) IntraMuscular once  loratadine 10 milliGRAM(s) Oral daily  oxybutynin 5 milliGRAM(s) Oral two times a day  pantoprazole    Tablet 40 milliGRAM(s) Oral before breakfast  valsartan 160 milliGRAM(s) Oral daily    HEALTH ISSUES - PROBLEM Dx:  Fever    HTN (hypertension)    HLD (hyperlipidemia)    DM (diabetes mellitus)    Cellulitis of skin           Patient is a 75y old  Male who presents with a chief complaint of fever and chills    INTERVAL HPI/OVERNIGHT EVENTS: No reported overnight events. Pt denied any issues overnight. He feels that the pain and redness of his leg is getting better. Results of the echo showed aortic root dilatation, no vegetations. Midline to be placed today.     REVIEW OF SYSTEMS:  CONSTITUTIONAL: No fever, weight loss, or fatigue  EYES: No eye pain, visual disturbances, or discharge  ENMT:  No difficulty hearing, tinnitus, vertigo; No sinus or throat pain  NECK: No pain or stiffness  BREASTS: No pain, masses, or nipple discharge  RESPIRATORY: No cough, wheezing, chills or hemoptysis; No shortness of breath  CARDIOVASCULAR: No chest pain, palpitations, dizziness, or leg swelling  GASTROINTESTINAL: No abdominal or epigastric pain. No nausea, vomiting, or hematemesis; No diarrhea or constipation. No melena or hematochezia.  GENITOURINARY: No dysuria, frequency, hematuria, or incontinence  NEUROLOGICAL: No headaches, memory loss, loss of strength, numbness, or tremors  SKIN: No itching, burning, rashes, or lesions   LYMPH NODES: No enlarged glands  ENDOCRINE: No heat or cold intolerance; No hair loss  MUSCULOSKELETAL: No joint pain or swelling; No muscle, back, or extremity pain  PSYCHIATRIC: No depression, anxiety, mood swings, or difficulty sleeping  HEME/LYMPH: No easy bruising, or bleeding gums  ALLERGY AND IMMUNOLOGIC: No hives or eczema    FAMILY HISTORY:  Family hx of prostate cancer (Father)    FH: type 2 diabetes (Father, Mother)    Vital Signs Last 24 Hrs  T(C): 36.3 (17 Sep 2021 06:40), Max: 37 (16 Sep 2021 22:24)  T(F): 97.3 (17 Sep 2021 06:40), Max: 98.6 (16 Sep 2021 22:24)  HR: 72 (17 Sep 2021 06:40) (64 - 78)  BP: 134/97 (17 Sep 2021 06:40) (121/85 - 134/97)  BP(mean): --  RR: 16 (17 Sep 2021 06:40) (16 - 17)  SpO2: 96% (17 Sep 2021 06:40) (96% - 97%)    ALLERGIES  penicillin (Rash)    PHYSICAL EXAM:  GENERAL: NAD, well-groomed, well-developed  HEAD:  Atraumatic, Normocephalic  EYES: EOMI, PERRLA, conjunctiva and sclera clear  ENMT: No tonsillar erythema, exudates, or enlargement; Moist mucous membranes, Good dentition, No lesions  NECK: Supple, No JVD, Normal thyroid  NERVOUS SYSTEM:  Alert & Oriented X3, Good concentration; Motor Strength 5/5 B/L upper and lower extremities; DTRs 2+ intact and symmetric. 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  CHEST/LUNG: Clear to percussion bilaterally; No rales, rhonchi, or rubs. Some scattered wheezing of RUL.  HEART: Regular rate and rhythm; No murmurs, rubs, or gallops  ABDOMEN: Soft, Nontender, Nondistended; Bowel sounds present  EXTREMITIES:  2+ Peripheral Pulses, No clubbing, cyanosis. 1+ pitting edema b/l. No increased pain on dorsiflexion of legs b/l  LYMPH: No lymphadenopathy noted  SKIN: No rashes or lesions. Erythematous and warm, raised area of L shin over tibia    Consultant(s) Notes Reviewed:  [x ] YES  [ ] NO  Care Discussed with Consultants/Other Providers [ x] YES  [ ] NO    LABS:                        12.8   3.01  )-----------( 170      ( 17 Sep 2021 07:58 )             37.9     09-17    136  |  100  |  20  ----------------------------<  129<H>  4.2   |  24  |  0.99    Ca    9.1      17 Sep 2021 07:58  Phos  3.8     09-17  Mg     2.50     09-17    RADIOLOGY & ADDITIONAL TESTS:   Transthoracic Echocardiogram (09.16.21 @ 16:12)  CONCLUSIONS:  1. Calcified trileaflet aortic valve with normal opening.  Mild aortic regurgitation.  2. Moderate aortic root dilatation  (Ao: 4.7 cm).  3. Normal left ventricular systolic function. No segmental  wall motion abnormalities.  4. Normal right ventricular size and function.      Imaging Personally Reviewed:  [ ] YES  [x ] NO    MEDICATIONS  (STANDING):  amLODIPine   Tablet 5 milliGRAM(s) Oral daily  ceFAZolin   IVPB 2000 milliGRAM(s) IV Intermittent every 8 hours  enoxaparin Injectable 40 milliGRAM(s) SubCutaneous daily  influenza  Vaccine (HIGH DOSE) 0.7 milliLiter(s) IntraMuscular once  loratadine 10 milliGRAM(s) Oral daily  oxybutynin 5 milliGRAM(s) Oral two times a day  pantoprazole    Tablet 40 milliGRAM(s) Oral before breakfast  valsartan 160 milliGRAM(s) Oral daily    HEALTH ISSUES - PROBLEM Dx:  Fever    HTN (hypertension)    HLD (hyperlipidemia)    DM (diabetes mellitus)    Cellulitis of skin

## 2021-09-17 NOTE — DISCHARGE NOTE NURSING/CASE MANAGEMENT/SOCIAL WORK - PATIENT PORTAL LINK FT
You can access the FollowMyHealth Patient Portal offered by [facility] by registering at the following website: [email or web address]. By joining [facility]’s FollowMyHealth portal, you will also be able to view your health information using other applications (apps) compatible with our system.

## 2021-09-17 NOTE — PROGRESS NOTE ADULT - PROBLEM SELECTOR PLAN 1
Pt found to have BCx positive for GBS. Currently clinically improving with normal vital signs, WBC downtrending  - f/u repeat BCx  - f/u TTE  - Continue Ancef Pt found to have BCx positive for GBS. Currently clinically improving with normal vital signs, WBC downtrending. PE showed redness of L shin, decreasing swelling, otherwise unremarkable.   - f/u repeat BCx - prelim read negative  - TTE showed no vegetations  - ID recs - Continue ceftriaxone - midline to be placed to continue 2 week course at home, repeat BCx in 1 week, follow up with ID 1 week after completion of ABX course

## 2021-09-17 NOTE — DISCHARGE NOTE PROVIDER - PROVIDER TOKENS
PROVIDER:[TOKEN:[[id number]],FOLLOWUP:[1 week]] PROVIDER:[TOKEN:[[id number]],FOLLOWUP:[1 week]],PROVIDER:[TOKEN:[[id number]],FOLLOWUP:[1 week]]

## 2021-09-17 NOTE — DISCHARGE NOTE NURSING/CASE MANAGEMENT/SOCIAL WORK - NSDCPNINST_GEN_ALL_CORE
Patient alert and oriented no s/s of distress noted, no complaint of pain, received daily dose of IV CefTRIAxone 2grams, flu vaccine given as ordered, pt being discharged with left upper arm midline for long term IV ABX, midline flushes without any difficulty, pt ready for discharge home.

## 2021-09-17 NOTE — DISCHARGE NOTE PROVIDER - HOSPITAL COURSE
Patient is a 74 y/o M with PMHx of HTN, diet controlled T2DM, HLD and recent prostate biopsy 2 months ago presented with fevers and LLE erythema and pain, admitted for LLE cellulitis. In ED, patient received Vancomycin and Ancef. 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 B/l VA Duplex negative for DVT. CXR clear for consolidation. UCx NGTD. BCx 9/14 positive for Group B Strep. Vancomycin D/C'ed, and switched from Ancef to Ceftriaxone. Repeat BCx NGTD. TTE with normal EF and no valvular abnormalities concerning for endocarditis. Midline placed for IV antibiotics for ___ week course.     Patient hemodynamically stable and prepared for discharge home.    Patient is a 76 y/o M with PMHx of HTN, diet controlled T2DM, HLD and recent prostate biopsy 2 months ago presented with fevers and LLE erythema and pain, admitted for LLE cellulitis. In ED, patient received Vancomycin and Ancef. 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 B/l VA Duplex negative for DVT. CXR clear for consolidation. UCx NGTD. BCx 9/14 positive for Group B Strep. Vancomycin D/C'ed, and switched from Ancef to Ceftriaxone. Repeat BCx NGTD. TTE with normal EF and no valvular abnormalities concerning for endocarditis. Midline placed for IV antibiotics for 2 week course.     Patient hemodynamically stable and prepared for discharge home.    Patient is a 74 y/o M with PMHx of HTN, diet controlled T2DM, HLD and recent prostate biopsy 2 months ago presented with fevers and LLE erythema and pain, admitted for LLE cellulitis. In ED, patient received Vancomycin and Ancef. 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 B/l VA Duplex negative for DVT. CXR clear for consolidation. UCx NGTD. BCx 9/14 positive for Group B Strep. Vancomycin D/C'ed, and switched from Ancef to Ceftriaxone. Repeat BCx NGTD. TTE with normal EF and no valvular abnormalities concerning for endocarditis. Found to also have aortic root dilatation, CT chest with IV contrast done which showed ___. Follow up with CT surg outpatient recommended. Midline placed for IV antibiotics for 2 week course with close ID followup recommended.     Patient hemodynamically stable and prepared for discharge home.    Patient is a 76 y/o M with PMHx of HTN, diet controlled T2DM, HLD and recent prostate biopsy 2 months ago presented with fevers and LLE erythema and pain, admitted for LLE cellulitis. In ED, patient received Vancomycin and Ancef. 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 B/l VA Duplex negative for DVT. CXR clear for consolidation. UCx NGTD. BCx 9/14 positive for Group B Strep. Vancomycin D/C'ed, and switched from Ancef to Ceftriaxone. Repeat BCx NGTD. TTE with normal EF and no valvular abnormalities concerning for endocarditis. Found to also have moderate aortic root dilatation on TTE, CT chest with IV contrast done which showed no emergent findings. Follow up with CT surg outpatient recommended. Midline placed for IV antibiotics for 2 week course with close ID followup recommended.     Patient hemodynamically stable and prepared for discharge home.    Patient is a 76 y/o M with PMHx of HTN, diet controlled T2DM, HLD and recent prostate biopsy 2 months ago presented with fevers and LLE erythema and pain, admitted for LLE cellulitis. In ED, patient received Vancomycin and Ancef. 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 B/l VA Duplex negative for DVT. CXR clear for consolidation. UCx NGTD. BCx 9/14 positive for Group B Strep. Vancomycin D/C'ed, and switched from Ancef to Ceftriaxone. Repeat BCx NGTD. TTE with normal EF and no valvular abnormalities concerning for endocarditis. Found to also have moderate aortic root dilatation on TTE, CT chest with IV contrast done which showed no emergent findings; aortic root was 4.1cm to 4.4cm vs. tte 4.7cm. Follow up with CT surg outpatient recommended. Midline placed for IV antibiotics for 2 week course with close ID followup recommended.     Patient hemodynamically stable and prepared for discharge home.

## 2021-09-17 NOTE — PROGRESS NOTE ADULT - ASSESSMENT
76 yo M with HTN, prior prostate biopsy (2 months ago), presenting with chills  Leukocytosis, fever  CXR clear  BCX GBS 2/4  LLE cellulitis  TTE generally reassuring, but note aortic root dilation  Low generally suspicion for NVE--rapidly clearing, defined primary source, no hardware  Overall,  1) GBS bacteremia  - Source in LLE cellulitis  - Ceftriaxone 2g q 24 through 9/28/21 (unless more investigation recommended by cardiology)  - Check CBC, BUN, Cr, LFTs weekly while on abx, fax to [phone]  - F/U pending BCXs  - Would seek cardiology input on aortic root dilation--would this be suggestive of abscess?  - S/p mindline  2) Leukocytosis  - Trend to normal  3) Cellulitis  - Monitor LLE for improvement    Follow up in ID clinic in 1st week of October for surveillance BCXs, [phone]    Jaun Belle MD  Pager [phone]  From 5pm-9am, and on weekends call [phone]

## 2021-09-17 NOTE — DISCHARGE NOTE PROVIDER - NSRESEARCHGRANT_OVERRIDEREC_GEN_A_CORE
"Chief Complaint   Patient presents with     Sinus Problem     sinus drainage, nasal congestion, facial pressure and cough for a few days.       Initial /77  Pulse 91  Temp 97.3  F (36.3  C) (Oral)  Wt 201 lb (91.2 kg)  LMP 06/28/2017 (Exact Date)  SpO2 100%  BMI 32.46 kg/m2 Estimated body mass index is 32.46 kg/(m^2) as calculated from the following:    Height as of 8/28/17: 5' 5.98\" (1.676 m).    Weight as of this encounter: 201 lb (91.2 kg).  Medication Reconciliation: complete    "
IMPROVE-DD Application Not Available

## 2021-09-17 NOTE — DISCHARGE NOTE PROVIDER - NSDCMRMEDTOKEN_GEN_ALL_CORE_FT
amLODIPine 5 mg oral tablet: 1 tab(s) orally once a day  Fioricet oral tablet: 1 tab(s) orally 1 to 2 times a day, As Needed  metFORMIN 500 mg oral tablet, extended release: 1 tab(s) orally 2 times a day  oxybutynin 15 mg/24 hr oral tablet, extended release: 1 tab(s) orally once a day  Protonix 40 mg oral delayed release tablet: 1 tab(s) orally once a day  valsartan 160 mg oral tablet: 1 tab(s) orally once a day   amLODIPine 5 mg oral tablet: 1 tab(s) orally once a day  Fioricet oral tablet: 1 tab(s) orally 1 to 2 times a day, As Needed  lactobacillus acidophilus oral capsule:  orally   loratadine 10 mg oral tablet: 1 tab(s) orally once a day  metFORMIN 500 mg oral tablet, extended release: 1 tab(s) orally 2 times a day  oxybutynin 15 mg/24 hr oral tablet, extended release: 1 tab(s) orally once a day  Protonix 40 mg oral delayed release tablet: 1 tab(s) orally once a day  sodium chloride 0.65% nasal spray:  nasal   valsartan 160 mg oral tablet: 1 tab(s) orally once a day   blood cultures: Draw repeat blood cultures on 9/22/21, 1 week after negative blood cultures  Dr. Jaun Belle, [phone]  ICD10 R78.81 Bacteremia  cefTRIAXone 2 g intravenous injection: 2 gram(s) intravenous once a day, last day 09/28/21  Dr. Jaun Belle, [phone]  ICD10 R78.81 Bacteremia  Fioricet oral tablet: 1 tab(s) orally 1 to 2 times a day, As Needed  lactobacillus acidophilus oral capsule:  orally   loratadine 10 mg oral tablet: 1 tab(s) orally once a day  metFORMIN 500 mg oral tablet, extended release: 1 tab(s) orally 2 times a day  oxybutynin 15 mg/24 hr oral tablet, extended release: 1 tab(s) orally once a day  Protonix 40 mg oral delayed release tablet: 1 tab(s) orally once a day  sodium chloride 0.65% nasal spray:  nasal   valsartan 160 mg oral tablet: 1 tab(s) orally once a day

## 2021-09-17 NOTE — DISCHARGE NOTE PROVIDER - NSDCCPCAREPLAN_GEN_ALL_CORE_FT
PRINCIPAL DISCHARGE DIAGNOSIS  Diagnosis: Cellulitis of skin  Assessment and Plan of Treatment: You were diagnosed with cellulitis of your left leg. You also were found to have bacteremia, which means there was bacteria in your blood stream. The bacteria was found to be group B strep. You were treated with antibiotics and will be going home with a midline to continue receiving antibiotics for 2 more weeks to make sure the bacteria is completely removed. You will follow up with the infectious disease doctors 1 week after completing your course of antibiotics. One week into your course of antibiotics, you will get repeat blood cultures to make sure no bacteria grows.       PRINCIPAL DISCHARGE DIAGNOSIS  Diagnosis: Cellulitis of skin  Assessment and Plan of Treatment: You were diagnosed with cellulitis of your left leg. You also were found to have bacteremia, which means there was bacteria in your blood stream. The bacteria was found to be group B strep. You were treated with antibiotics and will be going home with a midline to continue receiving antibiotics for 2 more weeks to make sure the bacteria is completely removed. You will follow up with the infectious disease doctors 1 week after completing your course of antibiotics. One week into your course of antibiotics, you will get repeat blood cultures to make sure no bacteria grows.      SECONDARY DISCHARGE DIAGNOSES  Diagnosis: Aortic root dilation  Assessment and Plan of Treatment: You were found to have moderate aortic root dilation on echocardiogram. You were evaluated by Cardiology who recommended a CAT Scan of the aorta which did not have any emergent findings. Please follow up with Cardiothoracic Surgery outpatient within 1 week of discharge for further evaluation.

## 2021-09-18 VITALS
HEART RATE: 61 BPM | RESPIRATION RATE: 18 BRPM | OXYGEN SATURATION: 97 % | TEMPERATURE: 98 F | DIASTOLIC BLOOD PRESSURE: 89 MMHG | SYSTOLIC BLOOD PRESSURE: 135 MMHG

## 2021-09-18 RX ADMIN — LORATADINE 10 MILLIGRAM(S): 10 TABLET ORAL at 11:22

## 2021-09-18 RX ADMIN — PANTOPRAZOLE SODIUM 40 MILLIGRAM(S): 20 TABLET, DELAYED RELEASE ORAL at 06:04

## 2021-09-18 RX ADMIN — Medication 5 MILLIGRAM(S): at 06:02

## 2021-09-18 RX ADMIN — VALSARTAN 160 MILLIGRAM(S): 80 TABLET ORAL at 06:00

## 2021-09-18 RX ADMIN — AMLODIPINE BESYLATE 5 MILLIGRAM(S): 2.5 TABLET ORAL at 06:03

## 2021-09-18 RX ADMIN — CEFTRIAXONE 100 MILLIGRAM(S): 500 INJECTION, POWDER, FOR SOLUTION INTRAMUSCULAR; INTRAVENOUS at 11:20

## 2021-09-18 RX ADMIN — INFLUENZA VIRUS VACCINE 0.7 MILLILITER(S): 15; 15; 15; 15 SUSPENSION INTRAMUSCULAR at 12:08

## 2021-09-18 RX ADMIN — Medication 1 TABLET(S): at 06:02

## 2021-09-18 NOTE — PROGRESS NOTE ADULT - PROVIDER SPECIALTY LIST ADULT
Cardiology
Infectious Disease
Infectious Disease
Internal Medicine
Infectious Disease
Internal Medicine

## 2021-09-18 NOTE — PROGRESS NOTE ADULT - SUBJECTIVE AND OBJECTIVE BOX
%%%%INCOMPLETE NOTE%%%%%     Dr. Miguel BURNETT:[id number]/NS: [phone]  After 7pm please page [phone] or [phone]    HAO HEBERT  75y  MRN: [id number]    Subjective:    Patient is a 75y old  Male who presents with a chief complaint of fever and chills (17 Sep 2021 13:17)      MEDICATIONS  (STANDING):  amLODIPine   Tablet 5 milliGRAM(s) Oral daily  cefTRIAXone   IVPB 2000 milliGRAM(s) IV Intermittent every 24 hours  influenza  Vaccine (HIGH DOSE) 0.7 milliLiter(s) IntraMuscular once  lactobacillus acidophilus 1 Tablet(s) Oral two times a day  loratadine 10 milliGRAM(s) Oral daily  oxybutynin 5 milliGRAM(s) Oral two times a day  pantoprazole    Tablet 40 milliGRAM(s) Oral before breakfast  rivaroxaban 10 milliGRAM(s) Oral daily  valsartan 160 milliGRAM(s) Oral daily    MEDICATIONS  (PRN):  acetaminophen   Tablet .. 650 milliGRAM(s) Oral every 6 hours PRN Temp greater or equal to 38C (100.4F), Mild Pain (1 - 3)  sodium chloride 0.65% Nasal 1 Spray(s) Both Nostrils four times a day PRN Nasal Congestion        Objective:    Vitals: Vital Signs Last 24 Hrs  T(C): 36.4 (09-18-21 @ 05:58), Max: 37.1 (09-17-21 @ 15:09)  T(F): 97.6 (09-18-21 @ 05:58), Max: 98.8 (09-17-21 @ 15:09)  HR: 61 (09-18-21 @ 05:58) (61 - 81)  BP: 135/89 (09-18-21 @ 05:58) (122/84 - 135/89)  BP(mean): --  RR: 18 (09-18-21 @ 05:58) (17 - 18)  SpO2: 97% (09-18-21 @ 05:58) (96% - 97%)            I&O's Summary    17 Sep 2021 07:01  -  18 Sep 2021 07:00  --------------------------------------------------------  IN: 450 mL / OUT: 0 mL / NET: 450 mL        PHYSICAL EXAM:  GENERAL: NAD, well-groomed, well-developed  HEAD:  Atraumatic, Normocephalic  EYES: EOMI, PERRLA, conjunctiva and sclera clear  ENMT: No tonsillar erythema, exudates, or enlargement; Moist mucous membranes, Good dentition, No lesions  NECK: Supple, No JVD, Normal thyroid  CHEST/LUNG: Clear to auscultation bilaterally; No rales, rhonchi, wheezing, or rubs  HEART: Regular rate and rhythm; No murmurs, rubs, or gallops  ABDOMEN: Soft, Nontender, Nondistended; Bowel sounds present  EXTREMITIES:  2+ Peripheral Pulses, No clubbing, cyanosis, or edema  LYMPH: No lymphadenopathy noted  SKIN: No rashes or lesions  NERVOUS SYSTEM:  Alert & Oriented X4, Good concentration  PSYCH: Normal Affect. Speaking in Full Sentences. Laying in bed comfortably; not agitated     LABS:                        12.8   3.01  )-----------( 170      ( 17 Sep 2021 07:58 )             37.9                         13.1   3.64  )-----------( 161      ( 16 Sep 2021 07:54 )             39.3                         12.7   4.00  )-----------( 137      ( 15 Sep 2021 07:43 )             38.0     Hgb Trend: 12.8<--, 13.1<--, 12.7<--, 13.5<--  09-17    136  |  100  |  20  ----------------------------<  129<H>  4.2   |  24  |  0.99  09-16    138  |  102  |  16  ----------------------------<  125<H>  3.8   |  25  |  1.05  09-15    137  |  103  |  14  ----------------------------<  143<H>  3.7   |  23  |  1.04    Ca    9.1      17 Sep 2021 07:58  Ca    9.1      16 Sep 2021 07:54  Ca    8.7      15 Sep 2021 07:43  Phos  3.8     09-17  Mg     2.50     09-17      Creatinine Trend: 0.99<--, 1.05<--, 1.04<--, 1.19<--                    CAPILLARY BLOOD GLUCOSE              Dr. Miguel Morgan PGY2 %%%%INCOMPLETE NOTE%%%%%     Dr. Miguel BURNETT:[id number]/NS: [phone]  After 7pm please page [phone] or [phone]    HAO HEBERT  75y  MRN: [id number]    Subjective:    Patient is a 75y old  Male who presents with a chief complaint of fever and chills (17 Sep 2021 13:17)      MEDICATIONS  (STANDING):  amLODIPine   Tablet 5 milliGRAM(s) Oral daily  cefTRIAXone   IVPB 2000 milliGRAM(s) IV Intermittent every 24 hours  influenza  Vaccine (HIGH DOSE) 0.7 milliLiter(s) IntraMuscular once  lactobacillus acidophilus 1 Tablet(s) Oral two times a day  loratadine 10 milliGRAM(s) Oral daily  oxybutynin 5 milliGRAM(s) Oral two times a day  pantoprazole    Tablet 40 milliGRAM(s) Oral before breakfast  rivaroxaban 10 milliGRAM(s) Oral daily  valsartan 160 milliGRAM(s) Oral daily    MEDICATIONS  (PRN):  acetaminophen   Tablet .. 650 milliGRAM(s) Oral every 6 hours PRN Temp greater or equal to 38C (100.4F), Mild Pain (1 - 3)  sodium chloride 0.65% Nasal 1 Spray(s) Both Nostrils four times a day PRN Nasal Congestion        Objective:    Vitals: Vital Signs Last 24 Hrs  T(C): 36.4 (09-18-21 @ 05:58), Max: 37.1 (09-17-21 @ 15:09)  T(F): 97.6 (09-18-21 @ 05:58), Max: 98.8 (09-17-21 @ 15:09)  HR: 61 (09-18-21 @ 05:58) (61 - 81)  BP: 135/89 (09-18-21 @ 05:58) (122/84 - 135/89)  BP(mean): --  RR: 18 (09-18-21 @ 05:58) (17 - 18)  SpO2: 97% (09-18-21 @ 05:58) (96% - 97%)            I&O's Summary    17 Sep 2021 07:01  -  18 Sep 2021 07:00  --------------------------------------------------------  IN: 450 mL / OUT: 0 mL / NET: 450 mL        PHYSICAL EXAM:  GENERAL: NAD, well-groomed, well-developed  HEAD:  Atraumatic, Normocephalic  EYES: EOMI, PERRLA, conjunctiva and sclera clear  ENMT: No tonsillar erythema, exudates, or enlargement; Moist mucous membranes, Good dentition, No lesions  NECK: Supple, No JVD, Normal thyroid  NERVOUS SYSTEM:  Alert & Oriented X3, Good concentration; Motor Strength 5/5 B/L upper and lower extremities; DTRs 2+ intact and symmetric. 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  CHEST/LUNG: Clear to percussion bilaterally; No rales, rhonchi, or rubs. Some scattered wheezing of RUL.  HEART: Regular rate and rhythm; No murmurs, rubs, or gallops  ABDOMEN: Soft, Nontender, Nondistended; Bowel sounds present  EXTREMITIES:  2+ Peripheral Pulses, No clubbing, cyanosis. 1+ pitting edema b/l. No increased pain on dorsiflexion of legs b/l  LYMPH: No lymphadenopathy noted  SKIN: No rashes or lesions. Erythematous and warm, raised area of L shin over tibia    LABS:                        12.8   3.01  )-----------( 170      ( 17 Sep 2021 07:58 )             37.9                         13.1   3.64  )-----------( 161      ( 16 Sep 2021 07:54 )             39.3                         12.7   4.00  )-----------( 137      ( 15 Sep 2021 07:43 )             38.0     Hgb Trend: 12.8<--, 13.1<--, 12.7<--, 13.5<--  09-17    136  |  100  |  20  ----------------------------<  129<H>  4.2   |  24  |  0.99  09-16    138  |  102  |  16  ----------------------------<  125<H>  3.8   |  25  |  1.05  09-15    137  |  103  |  14  ----------------------------<  143<H>  3.7   |  23  |  1.04    Ca    9.1      17 Sep 2021 07:58  Ca    9.1      16 Sep 2021 07:54  Ca    8.7      15 Sep 2021 07:43  Phos  3.8     09-17  Mg     2.50     09-17      Creatinine Trend: 0.99<--, 1.05<--, 1.04<--, 1.19<--                    CAPILLARY BLOOD GLUCOSE              Dr. Miguel Morgan PGY2 Dr. Miguel BURNETT:[id number]/NS: [phone]  After 7pm please page [phone] or [phone]    HAO HEBERT  75y  MRN: [id number]    Subjective:    Patient is a 75y old  Male who presents with a chief complaint of fever and chills (17 Sep 2021 13:17)      MEDICATIONS  (STANDING):  amLODIPine   Tablet 5 milliGRAM(s) Oral daily  cefTRIAXone   IVPB 2000 milliGRAM(s) IV Intermittent every 24 hours  influenza  Vaccine (HIGH DOSE) 0.7 milliLiter(s) IntraMuscular once  lactobacillus acidophilus 1 Tablet(s) Oral two times a day  loratadine 10 milliGRAM(s) Oral daily  oxybutynin 5 milliGRAM(s) Oral two times a day  pantoprazole    Tablet 40 milliGRAM(s) Oral before breakfast  rivaroxaban 10 milliGRAM(s) Oral daily  valsartan 160 milliGRAM(s) Oral daily    MEDICATIONS  (PRN):  acetaminophen   Tablet .. 650 milliGRAM(s) Oral every 6 hours PRN Temp greater or equal to 38C (100.4F), Mild Pain (1 - 3)  sodium chloride 0.65% Nasal 1 Spray(s) Both Nostrils four times a day PRN Nasal Congestion        Objective:    Vitals: Vital Signs Last 24 Hrs  T(C): 36.4 (09-18-21 @ 05:58), Max: 37.1 (09-17-21 @ 15:09)  T(F): 97.6 (09-18-21 @ 05:58), Max: 98.8 (09-17-21 @ 15:09)  HR: 61 (09-18-21 @ 05:58) (61 - 81)  BP: 135/89 (09-18-21 @ 05:58) (122/84 - 135/89)  BP(mean): --  RR: 18 (09-18-21 @ 05:58) (17 - 18)  SpO2: 97% (09-18-21 @ 05:58) (96% - 97%)            I&O's Summary    17 Sep 2021 07:01  -  18 Sep 2021 07:00  --------------------------------------------------------  IN: 450 mL / OUT: 0 mL / NET: 450 mL        PHYSICAL EXAM:  GENERAL: NAD, well-groomed, well-developed  HEAD:  Atraumatic, Normocephalic  EYES: EOMI, PERRLA, conjunctiva and sclera clear  ENMT: No tonsillar erythema, exudates, or enlargement; Moist mucous membranes, Good dentition, No lesions  NECK: Supple, No JVD, Normal thyroid  NERVOUS SYSTEM:  Alert & Oriented X3, Good concentration; Motor Strength 5/5 B/L upper and lower extremities; DTRs 2+ intact and symmetric. 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  CHEST/LUNG: Clear to percussion bilaterally; No rales, rhonchi, or rubs. Some scattered wheezing of RUL.  HEART: Regular rate and rhythm; No murmurs, rubs, or gallops  ABDOMEN: Soft, Nontender, Nondistended; Bowel sounds present  EXTREMITIES:  2+ Peripheral Pulses, No clubbing, cyanosis. 1+ pitting edema b/l. No increased pain on dorsiflexion of legs b/l  LYMPH: No lymphadenopathy noted  SKIN: No rashes or lesions. Erythematous and warm, raised area of L shin over tibia    LABS:                        12.8   3.01  )-----------( 170      ( 17 Sep 2021 07:58 )             37.9                         13.1   3.64  )-----------( 161      ( 16 Sep 2021 07:54 )             39.3                         12.7   4.00  )-----------( 137      ( 15 Sep 2021 07:43 )             38.0     Hgb Trend: 12.8<--, 13.1<--, 12.7<--, 13.5<--  09-17    136  |  100  |  20  ----------------------------<  129<H>  4.2   |  24  |  0.99  09-16    138  |  102  |  16  ----------------------------<  125<H>  3.8   |  25  |  1.05  09-15    137  |  103  |  14  ----------------------------<  143<H>  3.7   |  23  |  1.04    Ca    9.1      17 Sep 2021 07:58  Ca    9.1      16 Sep 2021 07:54  Ca    8.7      15 Sep 2021 07:43  Phos  3.8     09-17  Mg     2.50     09-17      Creatinine Trend: 0.99<--, 1.05<--, 1.04<--, 1.19<--                    CAPILLARY BLOOD GLUCOSE              Dr. Miguel Morgan PGY2

## 2021-09-18 NOTE — PROGRESS NOTE ADULT - ASSESSMENT
Echo 9/16/21: EF 65%,  Moderate aortic root dilatation  (Ao: 4.7 cm).. Normal left ventricular systolic function. No segmental wall motion abnormalities, mild AR    a/p     74 yo M with Incomplete RBBB, HTN, prior prostate biopsy (2 months ago), presenting with chills Leukocytosis, fever +  GBS bacteremia    #GBS bacteremia  -ID eval noted- likely source in LLE cellulitis  -s/p midline - IV abx  -Echo with no obvious signs of endocarditis but with aortic root dilation     #  Moderate aortic root dilatation  -Echo with root dilation measuring   (Ao: 4.7 cm).. with normal LV systolic function , mild AR   -f/u full CTA results   -CTs c/s     #LLE cellulitis  -no dvt on US   -abx, management per ID       DVT ppx   dcp per primary team

## 2021-09-18 NOTE — PROGRESS NOTE ADULT - ASSESSMENT
76 y/o M with PMH of HTN, diet controlled DM, HLD recent prostate biopsy presenting with fevers of unclear etiology. Pt found to have BCx positive for GBS, s/p vancomycin and currently tx with Ancef. Tmax 98.6 overnight, WBC count downtrending, currently stable. BCx drawn 9/15 prelim (-), will continue to follow for final result.  74 y/o M with PMH of HTN, diet controlled DM, HLD recent prostate biopsy presenting with fevers of unclear etiology. Pt found to have BCx positive for GBS, s/p vancomycin and currently tx with Ancef. Tmax 98.6 overnight, WBC count downtrending, currently stable. BCx drawn 9/15 prelim (-), pending dc today

## 2021-09-18 NOTE — PROGRESS NOTE ADULT - PROBLEM SELECTOR PLAN 5
HbA1c found to be 5.7%  - hold home metformin

## 2021-09-18 NOTE — PROGRESS NOTE ADULT - TIME BILLING
Patient seen and examined, agree with the above assessment and plan by BIANCA Wagner.  CV stable  Awaiting CTA of the chest  Cont IV abx

## 2021-09-18 NOTE — PROGRESS NOTE ADULT - SUBJECTIVE AND OBJECTIVE BOX
CARDIOLOGY FOLLOW UP - Dr. Todd  Date of Service: 9/18/21  CC: denies cp, sob, and palpitations     Review of Systems:  Constitutional: No fever, weight loss, or fatigue  Respiratory: No cough, wheezing, or hemoptysis, no shortness of breath  Cardiovascular: No chest pain, palpitations, passing out, dizziness, or leg swelling  Gastrointestinal: No abd or epigastric pain.  No nausea, vomiting, or hematemesis; no diarrhea or constipation, no melena or hematochezia  Vascular: no edema       PHYSICAL EXAM:  T(C): 36.4 (09-18-21 @ 05:58), Max: 37.1 (09-17-21 @ 15:09)  HR: 61 (09-18-21 @ 05:58) (61 - 81)  BP: 135/89 (09-18-21 @ 05:58) (122/84 - 135/89)  RR: 18 (09-18-21 @ 05:58) (17 - 18)  SpO2: 97% (09-18-21 @ 05:58) (96% - 97%)  Wt(kg): --  I&O's Summary    17 Sep 2021 07:01  -  18 Sep 2021 07:00  --------------------------------------------------------  IN: 750 mL / OUT: 0 mL / NET: 750 mL        Appearance: Normal	  Cardiovascular: Normal S1 S2,RRR, No JVD, No murmurs  Respiratory: Lungs clear to auscultation	  Gastrointestinal:  Soft, Non-tender, + BS	  Extremities: Normal range of motion, No clubbing, cyanosis or edema      Home Medications:  Fioricet oral tablet: 1 tab(s) orally 1 to 2 times a day, As Needed (15 Sep 2021 11:26)  lactobacillus acidophilus oral capsule:  orally  (17 Sep 2021 09:41)  loratadine 10 mg oral tablet: 1 tab(s) orally once a day (17 Sep 2021 09:41)  metFORMIN 500 mg oral tablet, extended release: 1 tab(s) orally 2 times a day (15 Sep 2021 11:26)  oxybutynin 15 mg/24 hr oral tablet, extended release: 1 tab(s) orally once a day (15 Sep 2021 11:26)  Protonix 40 mg oral delayed release tablet: 1 tab(s) orally once a day (15 Sep 2021 11:26)  sodium chloride 0.65% nasal spray:  nasal  (17 Sep 2021 09:41)  valsartan 160 mg oral tablet: 1 tab(s) orally once a day (15 Sep 2021 11:26)      MEDICATIONS  (STANDING):  amLODIPine   Tablet 5 milliGRAM(s) Oral daily  cefTRIAXone   IVPB 2000 milliGRAM(s) IV Intermittent every 24 hours  influenza  Vaccine (HIGH DOSE) 0.7 milliLiter(s) IntraMuscular once  lactobacillus acidophilus 1 Tablet(s) Oral two times a day  loratadine 10 milliGRAM(s) Oral daily  oxybutynin 5 milliGRAM(s) Oral two times a day  pantoprazole    Tablet 40 milliGRAM(s) Oral before breakfast  rivaroxaban 10 milliGRAM(s) Oral daily  valsartan 160 milliGRAM(s) Oral daily      TELEMETRY: 	    ECG:  	  RADIOLOGY:   < from: CT Angio Chest Aorta w/wo IV Cont (09.17.21 @ 18:30) >  PROCEDURE DATE:  Sep 17 2021     ******PRELIMINARY REPORT******    ******PRELIMINARY REPORT******            INTERPRETATION:  No emergent findings.          ******PRELIMINARY REPORT******    ******PRELIMINARY REPORT******          DIONISIO SINGLETON MD; Resident Radiology    < end of copied text >    DIAGNOSTIC TESTING:  [ ] Echocardiogram:  [ ]  Catheterization:  [ ] Stress Test:    OTHER: 	    LABS:	 	                            12.8   3.01  )-----------( 170      ( 17 Sep 2021 07:58 )             37.9     09-17    136  |  100  |  20  ----------------------------<  129<H>  4.2   |  24  |  0.99    Ca    9.1      17 Sep 2021 07:58  Phos  3.8     09-17  Mg     2.50     09-17

## 2021-09-18 NOTE — PROGRESS NOTE ADULT - PROBLEM SELECTOR PROBLEM 1
Bacteremia due to Gram-positive bacteria

## 2021-09-18 NOTE — PROGRESS NOTE ADULT - PROBLEM SELECTOR PLAN 2
Pt found to have warmth and redness on L lower extremity. Has hx of cellulitis in R leg. Currently clinically improving with normal vital signs, WBC downtrending, normal DVT study, echo grossly unremarkable  - Reassess L leg if pain worsens  - Management as above

## 2021-09-18 NOTE — PROGRESS NOTE ADULT - PROBLEM SELECTOR PLAN 3
BP has been in good control  - continue Norvasc and valsartan

## 2021-09-18 NOTE — PROGRESS NOTE ADULT - ATTENDING COMMENTS
seen and examined  above noted and personally confirmed as accurate by me  no diarrhea, no fever    PE unchanged except for complete resolution of left leg erythema    CTA shows no pneumonia, atherosclerotic changes of the aorta and coronary arteries. The aorta measures 4.1 cm at the sinuses of Valsalva as measured from sinus to commissure and 4.4 cm when measured from sinus to sinus. Ascending aorta measures up to 4.4 cm.    discussed with cardiology  no intervention required at this time  outpatient follow up with CTS (pt wants to consult with surgery at [facility])  CT report provided  advised to obtain CT disc from radiology after discharge    discharge home with IV ceftriaxone IV via midline to complete course    discussed with patient in detail, expresses understanding of treatment plans.  discussed with house staff in detail  discussed with cardiology

## 2021-09-18 NOTE — PROGRESS NOTE ADULT - PROBLEM SELECTOR PLAN 1
Pt found to have BCx positive for GBS. Currently clinically improving with normal vital signs, WBC downtrending. PE showed redness of L shin, decreasing swelling, otherwise unremarkable.   - f/u repeat BCx - prelim read negative  - TTE showed no vegetations  - ID recs - Continue ceftriaxone - midline to be placed to continue 2 week course at home, repeat BCx in 1 week, follow up with ID 1 week after completion of ABX course Pt found to have BCx positive for GBS. Currently clinically improving with normal vital signs, WBC downtrending. PE showed redness of L shin, decreasing swelling, otherwise unremarkable.   - repeat BCx - prelim read negative  - TTE showed no vegetations  - ID recs - Continue ceftriaxone - midline to be placed to continue 2 week course at home, repeat BCx in 1 week, follow up with ID 1 week after completion of ABX course

## 2021-09-18 NOTE — PROGRESS NOTE ADULT - PROBLEM SELECTOR PLAN 4
Lipid panel was wnl  - Resolved

## 2021-09-20 LAB
CULTURE RESULTS: SIGNIFICANT CHANGE UP
SPECIMEN SOURCE: SIGNIFICANT CHANGE UP

## 2021-09-28 PROBLEM — E11.9 TYPE 2 DIABETES MELLITUS WITHOUT COMPLICATIONS: Chronic | Status: ACTIVE | Noted: 2021-09-14

## 2021-09-28 PROBLEM — I10 ESSENTIAL (PRIMARY) HYPERTENSION: Chronic | Status: ACTIVE | Noted: 2021-09-14

## 2021-09-28 PROBLEM — E78.5 HYPERLIPIDEMIA, UNSPECIFIED: Chronic | Status: ACTIVE | Noted: 2021-09-14

## 2021-10-04 ENCOUNTER — APPOINTMENT (OUTPATIENT)
Dept: INFECTIOUS DISEASE | Facility: CLINIC | Age: 76
End: 2021-10-04
Payer: MEDICARE

## 2021-10-04 VITALS
WEIGHT: 225 LBS | SYSTOLIC BLOOD PRESSURE: 157 MMHG | HEIGHT: 70 IN | OXYGEN SATURATION: 96 % | TEMPERATURE: 97.2 F | HEART RATE: 93 BPM | DIASTOLIC BLOOD PRESSURE: 98 MMHG | BODY MASS INDEX: 32.21 KG/M2

## 2021-10-04 DIAGNOSIS — R78.81 BACTEREMIA: ICD-10-CM

## 2021-10-04 DIAGNOSIS — L03.90 CELLULITIS, UNSPECIFIED: ICD-10-CM

## 2021-10-04 DIAGNOSIS — R93.89 ABNORMAL FINDINGS ON DIAGNOSTIC IMAGING OF OTHER SPECIFIED BODY STRUCTURES: ICD-10-CM

## 2021-10-04 PROCEDURE — 99214 OFFICE O/P EST MOD 30 MIN: CPT

## 2021-10-06 PROBLEM — L03.90 CELLULITIS: Status: ACTIVE | Noted: 2021-10-06

## 2021-10-06 PROBLEM — R93.89 ABNORMAL FINDING ON IMAGING: Status: ACTIVE | Noted: 2021-10-06

## 2021-10-06 LAB
ALBUMIN SERPL ELPH-MCNC: 4.2 G/DL
ALP BLD-CCNC: 122 U/L
ALT SERPL-CCNC: 19 U/L
ANION GAP SERPL CALC-SCNC: 16 MMOL/L
AST SERPL-CCNC: 18 U/L
BASOPHILS # BLD AUTO: 0.06 K/UL
BASOPHILS NFR BLD AUTO: 1.5 %
BILIRUB SERPL-MCNC: 0.4 MG/DL
BUN SERPL-MCNC: 25 MG/DL
CALCIUM SERPL-MCNC: 9.6 MG/DL
CHLORIDE SERPL-SCNC: 103 MMOL/L
CO2 SERPL-SCNC: 23 MMOL/L
CREAT SERPL-MCNC: 1.14 MG/DL
CRP SERPL-MCNC: 6 MG/L
EOSINOPHIL # BLD AUTO: 0.14 K/UL
EOSINOPHIL NFR BLD AUTO: 3.4 %
ERYTHROCYTE [SEDIMENTATION RATE] IN BLOOD BY WESTERGREN METHOD: 11 MM/HR
ESTIMATED AVERAGE GLUCOSE: 117 MG/DL
GLUCOSE SERPL-MCNC: 118 MG/DL
HBA1C MFR BLD HPLC: 5.7 %
HCT VFR BLD CALC: 42.9 %
HGB BLD-MCNC: 13.9 G/DL
IMM GRANULOCYTES NFR BLD AUTO: 0.2 %
LYMPHOCYTES # BLD AUTO: 0.7 K/UL
LYMPHOCYTES NFR BLD AUTO: 17 %
MAN DIFF?: NORMAL
MCHC RBC-ENTMCNC: 30.2 PG
MCHC RBC-ENTMCNC: 32.4 GM/DL
MCV RBC AUTO: 93.3 FL
MONOCYTES # BLD AUTO: 0.31 K/UL
MONOCYTES NFR BLD AUTO: 7.5 %
NEUTROPHILS # BLD AUTO: 2.9 K/UL
NEUTROPHILS NFR BLD AUTO: 70.4 %
PLATELET # BLD AUTO: 165 K/UL
POTASSIUM SERPL-SCNC: 4.7 MMOL/L
PROT SERPL-MCNC: 6.8 G/DL
RBC # BLD: 4.6 M/UL
RBC # FLD: 13.7 %
SODIUM SERPL-SCNC: 141 MMOL/L
WBC # FLD AUTO: 4.12 K/UL

## 2021-10-06 NOTE — HISTORY OF PRESENT ILLNESS
[FreeTextEntry1] : 74 yo M with HTN, prior prostate biopsy, initially presenting to [facility] with fever and chills\par Found to have GBS from LLE cellulitis\par TTE had incidental finding of aortic root dilation\par Patient otherwise had progressive improvement and was sent out on IV Ceftriaxone\par Today, s/p treatment course with midline ceftriaxone\par No new symptoms\par No LLE redness, no fevers, no chills, no diarrhea, no other focal findings suggestive of infection\par Doing well

## 2021-10-06 NOTE — DATA REVIEWED
[FreeTextEntry1] : 9/13 BCX 2/4 GBS\par \par 9/15 BCX NGTD\par \par TTE 9/16:\par \par CONCLUSIONS:\par 1. Calcified trileaflet aortic valve with normal opening.\par Mild aortic regurgitation.\par 2. Moderate aortic root dilatation  (Ao: 4.7 cm).\par 3. Normal left ventricular systolic function. No segmental\par wall motion abnormalities.\par 4. Normal right ventricular size and function.

## 2021-10-06 NOTE — ASSESSMENT
[FreeTextEntry1] : 76 yo M with HTN, prior prostate biopsy, treated for at [facility] with GBS bacteremia\par BCX GBS 2/4, from LLE cellulitis\par TTE generally reassuring, but note aortic root dilation\par Low suspicion NVE\par Clinically improved today s/p treatment course with ceftriaxone\par Otherwise doing well\par Overall, Group B strep bacteremia, abnormal finding on imaging, on IV abx\par - Continue off abx\par - Check CBC, CMP, ESR, CRP, BCXs x 2, HbA1c\par - TTE/Aortic root dilation, patient following up with cardiology\par - Monitor for any signs recurrent infection\par - RTC PRN\par

## 2021-10-06 NOTE — REVIEW OF SYSTEMS
[Fever] : no fever [Chills] : no chills [Negative] : Constitutional [de-identified] : No LE cellulitis

## 2021-10-06 NOTE — PHYSICAL EXAM
[General Appearance - Alert] : alert [General Appearance - In No Acute Distress] : in no acute distress [General Appearance - Well-Appearing] : healthy appearing [Sclera] : the sclera and conjunctiva were normal [Neck Appearance] : the appearance of the neck was normal [] : no respiratory distress [Exaggerated Use Of Accessory Muscles For Inspiration] : no accessory muscle use [Auscultation Breath Sounds / Voice Sounds] : lungs were clear to auscultation bilaterally [Heart Rate And Rhythm] : heart rate was normal and rhythm regular [Heart Sounds] : normal S1 and S2 [Murmurs] : no murmurs [Edema] : there was no peripheral edema [FreeTextEntry1] : Cellulitis improved [Oriented To Time, Place, And Person] : oriented to person, place, and time [Affect] : the affect was normal

## 2021-10-12 LAB
BACTERIA BLD CULT: NORMAL
BACTERIA BLD CULT: NORMAL

## 2023-03-20 NOTE — PATIENT PROFILE ADULT - NSPROPTRIGHTNOTIFY_GEN_A_NUR
(around 4/20/2023), or if symptoms worsen or fail to improve. SUBJECTIVE/OBJECTIVE:  Complains of chronic pain to low back with radiation down legs at times. Pain is aggravated by walking long distances. Also complains of bilateral knee pain. Also aggravated by standing and walking long distances. Patient is taking mounjaro for diabetes and having no side effects. Would like to increase dose   Diabetes Mellitus: Current symptoms/problems include none. Medication compliance:  compliant all of the time  Diabetic diet compliance:  compliant all of the time,  Weight trend: decreasing  Current exercise: no regular exercise      Home blood sugar records: patient does not test  Any episodes of hypoglycemia? no  Eye exam current (within one year): yes   reports that she has never smoked. She has never used smokeless tobacco.   Daily Aspirin?  No    Lab Results       Component                Value               Date                       LABA1C                   7.1                 03/20/2023                 LABA1C                   7.7                 12/14/2022                 LABA1C                   7.0                 06/15/2022            Lab Results       Component                Value               Date                       LABMICR                  <12.0               11/16/2021                 CREATININE               0.9                 08/27/2021            Lab Results       Component                Value               Date                       ALT                      11                  08/27/2021                 AST                      11                  08/27/2021            Lab Results       Component                Value               Date                       CHOL                     151                 08/27/2021                 TRIG                     121                 08/27/2021                 HDL                      38                  07/16/2019                 LDLCALC
declines

## 2023-12-07 ENCOUNTER — OFFICE (OUTPATIENT)
Dept: URBAN - METROPOLITAN AREA CLINIC 90 | Facility: CLINIC | Age: 78
Setting detail: OPHTHALMOLOGY
End: 2023-12-07
Payer: MEDICARE

## 2023-12-07 DIAGNOSIS — S04.51XD: ICD-10-CM

## 2023-12-07 DIAGNOSIS — H35.3111: ICD-10-CM

## 2023-12-07 DIAGNOSIS — H25.13: ICD-10-CM

## 2023-12-07 DIAGNOSIS — H43.391: ICD-10-CM

## 2023-12-07 DIAGNOSIS — H35.3221: ICD-10-CM

## 2023-12-07 PROCEDURE — 92014 COMPRE OPH EXAM EST PT 1/>: CPT | Performed by: OPHTHALMOLOGY

## 2023-12-07 PROCEDURE — 92134 CPTRZ OPH DX IMG PST SGM RTA: CPT | Performed by: OPHTHALMOLOGY

## 2023-12-07 ASSESSMENT — REFRACTION_MANIFEST
OS_AXIS: 100
OD_CYLINDER: -1.75
OD_AXIS: 070
OS_VA2: 20/25(J1)
OD_VA1: 20/25-2
OS_VA1: 20/40+-
OD_CYLINDER: -1.25
OD_VA2: 20/25(J1)
OS_CYLINDER: -0.75
OD_ADD: +2..50
OS_VA1: 20/40+
OS_SPHERE: +3.00
OS_AXIS: 125
OS_AXIS: 125
OD_ADD: +2..50
OD_VA1: 20/20-
OS_VA2: 20/25(J1)
OS_CYLINDER: -1.00
OS_SPHERE: +3.00
OS_SPHERE: +3.00
OD_AXIS: 065
OD_SPHERE: +3.00
OS_VA1: 20/40+
OS_VA2: 20/20(J1+)
OD_SPHERE: +3.00
OD_AXIS: 070
OS_ADD: +2.50
OS_ADD: +2..50
OD_VA1: 20/20-
OS_ADD: +2..50
OD_VA2: 20/20
OD_SPHERE: +3.25
OS_CYLINDER: -0.75
OD_VA2: 20/25(J1)
OD_ADD: +2.50
OD_CYLINDER: -0.75

## 2023-12-07 ASSESSMENT — SPHEQUIV_DERIVED
OS_SPHEQUIV: 2.625
OD_SPHEQUIV: 2.375
OD_SPHEQUIV: 2.75
OD_SPHEQUIV: 2.625
OD_SPHEQUIV: 2.375
OS_SPHEQUIV: 2.5

## 2023-12-07 ASSESSMENT — REFRACTION_CURRENTRX
OD_AXIS: 071
OD_SPHERE: +3.50
OS_AXIS: 087
OS_OVR_VA: 20/
OD_VPRISM_DIRECTION: PROGS
OS_ADD: +3.00
OD_OVR_VA: 20/
OD_ADD: +3.00
OD_CYLINDER: -1.00
OS_CYLINDER: -0.75
OS_SPHERE: +2.75
OS_VPRISM_DIRECTION: PROGS

## 2023-12-07 ASSESSMENT — LID EXAM ASSESSMENTS
OD_COMMENTS: COLLARETTES RUL
OS_COMMENTS: COLLARETTES LUL

## 2023-12-07 ASSESSMENT — REFRACTION_AUTOREFRACTION
OS_SPHERE: +3.00
OS_CYLINDER: -0.75
OD_CYLINDER: -1.50
OD_SPHERE: +3.50
OD_AXIS: 065
OS_AXIS: 127

## 2023-12-07 ASSESSMENT — CONFRONTATIONAL VISUAL FIELD TEST (CVF)
OD_FINDINGS: FULL
OS_FINDINGS: FULL

## 2024-05-30 ENCOUNTER — APPOINTMENT (OUTPATIENT)
Dept: ORTHOPEDIC SURGERY | Facility: CLINIC | Age: 79
End: 2024-05-30

## 2024-05-30 VITALS — HEIGHT: 70 IN | WEIGHT: 215 LBS | BODY MASS INDEX: 30.78 KG/M2

## 2024-05-30 DIAGNOSIS — M79.10 MYALGIA, UNSPECIFIED SITE: ICD-10-CM

## 2024-05-30 DIAGNOSIS — M51.36 OTHER INTERVERTEBRAL DISC DEGENERATION, LUMBAR REGION: ICD-10-CM

## 2024-05-30 PROCEDURE — 20552 NJX 1/MLT TRIGGER POINT 1/2: CPT

## 2024-05-30 PROCEDURE — 72100 X-RAY EXAM L-S SPINE 2/3 VWS: CPT

## 2024-05-30 PROCEDURE — 99204 OFFICE O/P NEW MOD 45 MIN: CPT | Mod: 25

## 2024-05-30 RX ORDER — DICLOFENAC SODIUM 75 MG/1
75 TABLET, DELAYED RELEASE ORAL
Qty: 60 | Refills: 1 | Status: ACTIVE | COMMUNITY
Start: 2024-05-30 | End: 1900-01-01

## 2024-05-30 RX ORDER — TIZANIDINE 4 MG/1
4 TABLET ORAL
Qty: 30 | Refills: 1 | Status: ACTIVE | COMMUNITY
Start: 2024-05-30 | End: 1900-01-01

## 2024-05-30 NOTE — PHYSICAL EXAM
[Normal] : Gait: normal [Benitez's Sign] : negative Benitez's sign [Pronator Drift] : negative pronator drift [SLR] : negative straight leg raise [de-identified] : 5 out of 5 motor strength, sensation is intact and symmetrical full range of motion flexion extension and rotation, no palpatory tenderness full range of motion of hips knees shoulders and elbows (all four extremities), no atrophy, negative straight leg raise, no pathological reflexes, no swelling, normal ambulation, no apparent distress skin is intact, no palpable lymph nodes, no upper or lower extremity instability, alert and oriented x3 and normal mood. Normal finger-to nose test.  No upper motor neuron findings. Left SI joint pain. [de-identified] : XR AP Lat Lumbar 05/30/2024 -Lumbar disc degenerative disease- reviewed with patient.

## 2024-05-30 NOTE — HISTORY OF PRESENT ILLNESS
[de-identified] : 78 year old male presents for initial evaluation of chronic lower back pain, with exacerbation since early this week. He states he got up from a chair and felt the pain. He states that the pain is currently mostly left sided. He states he typically mostly has right sided lower back pain. States that the pain radiates down the RLE posteriorly to the foot, and down to the left posterior thigh just past the knee to the left.  Standing, sitting, bending, walking aggravates the pain.  He tried MDP and firocet which provided relief. He also took left over percocet from a prior bunion surgery but did not feel relief.  Has not tried PT or chiropractic care. Has had trigger point injections in the past which helped at the time, denies any recent injections. 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  No fever, chills, sweats, nausea/vomiting. No bowel or bladder dysfunction, no recent weight loss or gain. No night pain. This history is in addition to the intake form that I personally reviewed. [Stable] : stable

## 2024-05-30 NOTE — ADDENDUM
[FreeTextEntry1] : This note was written by Jay Huerta on 05/30/2024 acting as scribe for Dr. Kenn Hastings M.D.  I, Kenn Hastings MD, have read and attest that all the information, medical decision making and discharge instructions within are true and accurate.

## 2024-05-30 NOTE — DISCUSSION/SUMMARY
[de-identified] : Lumbar disc degenerative disease Left sacroiliitis. Discussed all options. Diclofenac PRN. Lidocaine patch PRN. I offered an injection after all risks were explained including allergic reaction to an infection under sterile conditions 1 mg of Depo-Medrol and 2 cc of 1% Lidocaine without epinephrine was injected into the painful site. The patient tolerated the procedure well and received significant relief following the injection. (left SI) All options discussed including rest, medicine, home exercise, acupuncture, Chiropractic care, Physical Therapy, Pain management, and last resort surgery. All questions were answered, all alternatives discussed, and the patient is in complete agreement with the treatment plan which the patient contributed to and discussed with me through the shared decision-making process. Follow-up appointment as instructed. Any issues and the patient will call or come in sooner. Wife agrees with plan.

## 2024-06-05 ENCOUNTER — APPOINTMENT (OUTPATIENT)
Dept: ORTHOPEDIC SURGERY | Facility: CLINIC | Age: 79
End: 2024-06-05

## 2024-06-07 ENCOUNTER — APPOINTMENT (OUTPATIENT)
Dept: CT IMAGING | Facility: IMAGING CENTER | Age: 79
End: 2024-06-07
Payer: MEDICARE

## 2024-06-07 ENCOUNTER — OUTPATIENT (OUTPATIENT)
Dept: OUTPATIENT SERVICES | Facility: HOSPITAL | Age: 79
LOS: 1 days | End: 2024-06-07
Payer: MEDICARE

## 2024-06-07 ENCOUNTER — RESULT REVIEW (OUTPATIENT)
Age: 79
End: 2024-06-07

## 2024-06-07 DIAGNOSIS — G54.4 LUMBOSACRAL ROOT DISORDERS, NOT ELSEWHERE CLASSIFIED: ICD-10-CM

## 2024-06-07 DIAGNOSIS — G95.9 DISEASE OF SPINAL CORD, UNSPECIFIED: ICD-10-CM

## 2024-06-07 PROCEDURE — 76377 3D RENDER W/INTRP POSTPROCES: CPT | Mod: 26

## 2024-06-07 PROCEDURE — 72131 CT LUMBAR SPINE W/O DYE: CPT | Mod: 26

## 2024-06-07 PROCEDURE — 76377 3D RENDER W/INTRP POSTPROCES: CPT

## 2024-06-07 PROCEDURE — 72131 CT LUMBAR SPINE W/O DYE: CPT

## 2024-06-10 ENCOUNTER — APPOINTMENT (OUTPATIENT)
Dept: ORTHOPEDIC SURGERY | Facility: CLINIC | Age: 79
End: 2024-06-10
Payer: MEDICARE

## 2024-06-10 VITALS — BODY MASS INDEX: 30.78 KG/M2 | WEIGHT: 215 LBS | HEIGHT: 70 IN

## 2024-06-10 DIAGNOSIS — M54.50 LOW BACK PAIN, UNSPECIFIED: ICD-10-CM

## 2024-06-10 PROCEDURE — 99204 OFFICE O/P NEW MOD 45 MIN: CPT

## 2024-06-10 RX ORDER — DICLOFENAC SODIUM 75 MG/1
75 TABLET, DELAYED RELEASE ORAL
Qty: 60 | Refills: 2 | Status: ACTIVE | COMMUNITY
Start: 2024-06-10 | End: 1900-01-01

## 2024-06-10 NOTE — PHYSICAL EXAM
[LE] : Sensory: Intact in bilateral lower extremities [Obese] : obese [Normal] : Oriented to person, place, and time, insight and judgement were intact and the affect was normal [de-identified] : No tenderness to palpation or pain to percussion over the thoracolumbar region or pelvic compression  [de-identified] : CT scan Evaluation of the Lumbar Spine performed on 06/07/2024 shows multilevel degenerative change without obvious fracture.  There are bridging syndesmophytes noted.  There are no bony lesions noted.  MRI of the lumbar spine performed recently is not available for our review.

## 2024-06-10 NOTE — HISTORY OF PRESENT ILLNESS
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  Patient is a pre-diabetes and recently done a biopsy of the prostate. The patient states that he's been experiencing lower back pain which began about 2 weeks ago notice changes in his balance while going up and down stairs. He states that he was lifting objects going p and downstairs where he felt minimal pain on occurrence. He denies any other accidents or injuries. He also notes that he does exercise on his own, such as swimming. The patient currently takes Tylenol, Tramadol, and Voltaren Gel to aid his current symptoms. Patient states that both help with the addition of laying.

## 2024-06-10 NOTE — ADDENDUM
[FreeTextEntry1] : I, Fausto Bass Jr, acted solely as a scribe for Dr. Daryl Phan on this date 06/10/2024 .  All medical record entries made by the Scribe were at my, Dr. Daryl Phan, direction and personally dictated by me on 06/10/2024 . I have reviewed the chart and agree that the record accurately reflects my personal performance of the history, physical exam, assessment and plan. I have also personally directed, reviewed, and agreed with the chart.

## 2024-06-10 NOTE — DISCUSSION/SUMMARY
[2 Weeks] : in 2 weeks [Medication Risks Reviewed] : Medication risks reviewed [de-identified] : I discussed the underlying pathophysiology of the patient's condition & Ct scan findings. I expressed to the patient that his symptoms are related to a flare up of spinal stenosis from degenerative arthritis, rather than a stress fracture at this time. The patient and I discussed his options regarding treatment. Activity modifications were reviewed with the patient at length. At this time, I advised that the patient should continue to walk and recline. I provided the patient with Diclofenac to aid his symptoms. The patient should follow-up with me in 2 weeks for a repeat clinical evaluation. On the patient's return, we will consider starting physical therapy.

## 2024-06-18 ENCOUNTER — NON-APPOINTMENT (OUTPATIENT)
Age: 79
End: 2024-06-18

## 2024-06-19 DIAGNOSIS — S32.009S UNSPECIFIED FRACTURE OF UNSPECIFIED LUMBAR VERTEBRA, SEQUELA: ICD-10-CM

## 2024-06-24 ENCOUNTER — APPOINTMENT (OUTPATIENT)
Dept: ORTHOPEDIC SURGERY | Facility: CLINIC | Age: 79
End: 2024-06-24
Payer: MEDICARE

## 2024-06-24 VITALS
WEIGHT: 200 LBS | BODY MASS INDEX: 28.63 KG/M2 | HEIGHT: 70 IN | SYSTOLIC BLOOD PRESSURE: 179 MMHG | HEART RATE: 65 BPM | DIASTOLIC BLOOD PRESSURE: 102 MMHG

## 2024-06-24 DIAGNOSIS — S32.009A UNSPECIFIED FRACTURE OF UNSPECIFIED LUMBAR VERTEBRA, INITIAL ENCOUNTER FOR CLOSED FRACTURE: ICD-10-CM

## 2024-06-24 PROCEDURE — 99214 OFFICE O/P EST MOD 30 MIN: CPT

## 2024-07-15 ENCOUNTER — OUTPATIENT (OUTPATIENT)
Dept: OUTPATIENT SERVICES | Facility: HOSPITAL | Age: 79
LOS: 1 days | End: 2024-07-15
Payer: MEDICARE

## 2024-07-15 ENCOUNTER — RESULT REVIEW (OUTPATIENT)
Age: 79
End: 2024-07-15

## 2024-07-15 ENCOUNTER — APPOINTMENT (OUTPATIENT)
Dept: CT IMAGING | Facility: IMAGING CENTER | Age: 79
End: 2024-07-15
Payer: MEDICARE

## 2024-07-15 DIAGNOSIS — S32.009A UNSPECIFIED FRACTURE OF UNSPECIFIED LUMBAR VERTEBRA, INITIAL ENCOUNTER FOR CLOSED FRACTURE: ICD-10-CM

## 2024-07-15 DIAGNOSIS — Z00.8 ENCOUNTER FOR OTHER GENERAL EXAMINATION: ICD-10-CM

## 2024-07-15 PROCEDURE — 76377 3D RENDER W/INTRP POSTPROCES: CPT

## 2024-07-15 PROCEDURE — 72131 CT LUMBAR SPINE W/O DYE: CPT

## 2024-07-15 PROCEDURE — 76377 3D RENDER W/INTRP POSTPROCES: CPT | Mod: 26

## 2024-07-15 PROCEDURE — 72131 CT LUMBAR SPINE W/O DYE: CPT | Mod: 26

## 2024-07-16 ENCOUNTER — APPOINTMENT (OUTPATIENT)
Dept: OTOLARYNGOLOGY | Facility: CLINIC | Age: 79
End: 2024-07-16
Payer: MEDICARE

## 2024-07-16 VITALS
SYSTOLIC BLOOD PRESSURE: 119 MMHG | DIASTOLIC BLOOD PRESSURE: 81 MMHG | HEART RATE: 102 BPM | HEIGHT: 70 IN | TEMPERATURE: 98 F | BODY MASS INDEX: 28.63 KG/M2 | WEIGHT: 200 LBS

## 2024-07-16 DIAGNOSIS — H90.3 SENSORINEURAL HEARING LOSS, BILATERAL: ICD-10-CM

## 2024-07-16 PROCEDURE — 99204 OFFICE O/P NEW MOD 45 MIN: CPT | Mod: 25

## 2024-07-16 PROCEDURE — G0268 REMOVAL OF IMPACTED WAX MD: CPT

## 2024-07-16 PROCEDURE — 92557 COMPREHENSIVE HEARING TEST: CPT

## 2024-07-16 PROCEDURE — 92567 TYMPANOMETRY: CPT

## 2024-07-17 ENCOUNTER — APPOINTMENT (OUTPATIENT)
Dept: ORTHOPEDIC SURGERY | Facility: CLINIC | Age: 79
End: 2024-07-17
Payer: MEDICARE

## 2024-07-17 DIAGNOSIS — M48.062 SPINAL STENOSIS, LUMBAR REGION WITH NEUROGENIC CLAUDICATION: ICD-10-CM

## 2024-07-17 PROCEDURE — 99214 OFFICE O/P EST MOD 30 MIN: CPT

## 2024-08-08 NOTE — PATIENT PROFILE ADULT - NSPROIMPLANTSMEDDEV_GEN_A_NUR
HR=90 bpm, ZDMW=771/84 mmhg, SpO2=95.0 %, Resp=12 B/min, EtCO2=35 mmHg, Apnea=0 Seconds, Comment=NSR None

## 2025-01-16 ENCOUNTER — APPOINTMENT (OUTPATIENT)
Dept: OTOLARYNGOLOGY | Facility: CLINIC | Age: 80
End: 2025-01-16

## 2025-02-27 ENCOUNTER — APPOINTMENT (OUTPATIENT)
Dept: OPHTHALMOLOGY | Facility: CLINIC | Age: 80
End: 2025-02-27
Payer: MEDICARE

## 2025-02-27 ENCOUNTER — NON-APPOINTMENT (OUTPATIENT)
Age: 80
End: 2025-02-27

## 2025-02-27 PROCEDURE — 92133 CPTRZD OPH DX IMG PST SGM ON: CPT

## 2025-02-27 PROCEDURE — 92004 COMPRE OPH EXAM NEW PT 1/>: CPT

## 2025-02-27 PROCEDURE — 92015 DETERMINE REFRACTIVE STATE: CPT

## 2025-03-27 ENCOUNTER — APPOINTMENT (OUTPATIENT)
Dept: OTOLARYNGOLOGY | Facility: CLINIC | Age: 80
End: 2025-03-27
Payer: MEDICARE

## 2025-03-27 VITALS
HEIGHT: 70 IN | DIASTOLIC BLOOD PRESSURE: 89 MMHG | BODY MASS INDEX: 30.35 KG/M2 | HEART RATE: 71 BPM | WEIGHT: 212 LBS | SYSTOLIC BLOOD PRESSURE: 156 MMHG | OXYGEN SATURATION: 95 %

## 2025-03-27 DIAGNOSIS — H90.3 SENSORINEURAL HEARING LOSS, BILATERAL: ICD-10-CM

## 2025-03-27 DIAGNOSIS — R26.89 OTHER ABNORMALITIES OF GAIT AND MOBILITY: ICD-10-CM

## 2025-03-27 PROCEDURE — 92557 COMPREHENSIVE HEARING TEST: CPT

## 2025-03-27 PROCEDURE — 92567 TYMPANOMETRY: CPT

## 2025-03-27 PROCEDURE — 99214 OFFICE O/P EST MOD 30 MIN: CPT

## 2025-04-16 ENCOUNTER — NON-APPOINTMENT (OUTPATIENT)
Age: 80
End: 2025-04-16

## 2025-04-17 ENCOUNTER — APPOINTMENT (OUTPATIENT)
Dept: ORTHOPEDIC SURGERY | Facility: CLINIC | Age: 80
End: 2025-04-17
Payer: MEDICARE

## 2025-04-17 VITALS
HEIGHT: 70 IN | WEIGHT: 210 LBS | SYSTOLIC BLOOD PRESSURE: 166 MMHG | HEART RATE: 72 BPM | DIASTOLIC BLOOD PRESSURE: 111 MMHG | BODY MASS INDEX: 30.06 KG/M2

## 2025-04-17 DIAGNOSIS — M25.511 PAIN IN RIGHT SHOULDER: ICD-10-CM

## 2025-04-17 PROCEDURE — 73030 X-RAY EXAM OF SHOULDER: CPT | Mod: RT

## 2025-04-17 PROCEDURE — 99214 OFFICE O/P EST MOD 30 MIN: CPT

## 2025-08-07 ENCOUNTER — NON-APPOINTMENT (OUTPATIENT)
Age: 80
End: 2025-08-07

## 2025-08-07 ENCOUNTER — APPOINTMENT (OUTPATIENT)
Dept: OTOLARYNGOLOGY | Facility: CLINIC | Age: 80
End: 2025-08-07
Payer: MEDICARE

## 2025-08-07 VITALS
DIASTOLIC BLOOD PRESSURE: 77 MMHG | WEIGHT: 211 LBS | SYSTOLIC BLOOD PRESSURE: 125 MMHG | HEART RATE: 68 BPM | BODY MASS INDEX: 30.21 KG/M2 | OXYGEN SATURATION: 95 % | HEIGHT: 70 IN

## 2025-08-07 DIAGNOSIS — H90.3 SENSORINEURAL HEARING LOSS, BILATERAL: ICD-10-CM

## 2025-08-07 DIAGNOSIS — R26.89 OTHER ABNORMALITIES OF GAIT AND MOBILITY: ICD-10-CM

## 2025-08-07 PROCEDURE — 99203 OFFICE O/P NEW LOW 30 MIN: CPT

## 2025-08-26 ENCOUNTER — APPOINTMENT (OUTPATIENT)
Dept: ORTHOPEDIC SURGERY | Facility: CLINIC | Age: 80
End: 2025-08-26
Payer: MEDICARE

## 2025-08-26 VITALS — HEIGHT: 70 IN | WEIGHT: 211 LBS | BODY MASS INDEX: 30.21 KG/M2

## 2025-08-26 DIAGNOSIS — M17.0 BILATERAL PRIMARY OSTEOARTHRITIS OF KNEE: ICD-10-CM

## 2025-08-26 PROCEDURE — 99214 OFFICE O/P EST MOD 30 MIN: CPT | Mod: 25

## 2025-08-26 PROCEDURE — 20610 DRAIN/INJ JOINT/BURSA W/O US: CPT | Mod: 50

## 2025-08-26 PROCEDURE — 73564 X-RAY EXAM KNEE 4 OR MORE: CPT | Mod: 50
